# Patient Record
Sex: MALE | Race: BLACK OR AFRICAN AMERICAN | Employment: FULL TIME | URBAN - METROPOLITAN AREA
[De-identification: names, ages, dates, MRNs, and addresses within clinical notes are randomized per-mention and may not be internally consistent; named-entity substitution may affect disease eponyms.]

---

## 2017-05-24 ENCOUNTER — APPOINTMENT (OUTPATIENT)
Dept: CT IMAGING | Age: 42
End: 2017-05-24
Attending: EMERGENCY MEDICINE
Payer: MEDICAID

## 2017-05-24 ENCOUNTER — HOSPITAL ENCOUNTER (EMERGENCY)
Age: 42
Discharge: HOME | End: 2017-05-24
Attending: EMERGENCY MEDICINE | Admitting: EMERGENCY MEDICINE
Payer: MEDICAID

## 2017-05-24 VITALS
TEMPERATURE: 97.7 F | WEIGHT: 217 LBS | OXYGEN SATURATION: 99 % | RESPIRATION RATE: 18 BRPM | DIASTOLIC BLOOD PRESSURE: 87 MMHG | BODY MASS INDEX: 36.15 KG/M2 | SYSTOLIC BLOOD PRESSURE: 141 MMHG | HEIGHT: 65 IN | HEART RATE: 89 BPM

## 2017-05-24 DIAGNOSIS — N23 RENAL COLIC: Primary | ICD-10-CM

## 2017-05-24 LAB
ANION GAP BLD CALC-SCNC: 9 MMOL/L (ref 3–18)
APPEARANCE UR: CLEAR
BASOPHILS # BLD AUTO: 0 K/UL (ref 0–0.06)
BASOPHILS # BLD: 0 % (ref 0–2)
BILIRUB UR QL: NEGATIVE
BUN SERPL-MCNC: 10 MG/DL (ref 7–18)
BUN/CREAT SERPL: 9 (ref 12–20)
CALCIUM SERPL-MCNC: 8.8 MG/DL (ref 8.5–10.1)
CHLORIDE SERPL-SCNC: 106 MMOL/L (ref 100–108)
CO2 SERPL-SCNC: 24 MMOL/L (ref 21–32)
COLOR UR: YELLOW
CREAT SERPL-MCNC: 1.12 MG/DL (ref 0.6–1.3)
DIFFERENTIAL METHOD BLD: NORMAL
EOSINOPHIL # BLD: 0.1 K/UL (ref 0–0.4)
EOSINOPHIL NFR BLD: 2 % (ref 0–5)
ERYTHROCYTE [DISTWIDTH] IN BLOOD BY AUTOMATED COUNT: 13.3 % (ref 11.6–14.5)
GLUCOSE SERPL-MCNC: 138 MG/DL (ref 74–99)
GLUCOSE UR STRIP.AUTO-MCNC: NEGATIVE MG/DL
HCT VFR BLD AUTO: 42.5 % (ref 36–48)
HGB BLD-MCNC: 14.5 G/DL (ref 13–16)
HGB UR QL STRIP: NEGATIVE
KETONES UR QL STRIP.AUTO: NEGATIVE MG/DL
LEUKOCYTE ESTERASE UR QL STRIP.AUTO: NEGATIVE
LYMPHOCYTES # BLD AUTO: 42 % (ref 21–52)
LYMPHOCYTES # BLD: 2 K/UL (ref 0.9–3.6)
MCH RBC QN AUTO: 30.6 PG (ref 24–34)
MCHC RBC AUTO-ENTMCNC: 34.1 G/DL (ref 31–37)
MCV RBC AUTO: 89.7 FL (ref 74–97)
MONOCYTES # BLD: 0.4 K/UL (ref 0.05–1.2)
MONOCYTES NFR BLD AUTO: 9 % (ref 3–10)
NEUTS SEG # BLD: 2.3 K/UL (ref 1.8–8)
NEUTS SEG NFR BLD AUTO: 47 % (ref 40–73)
NITRITE UR QL STRIP.AUTO: NEGATIVE
PH UR STRIP: 7.5 [PH] (ref 5–8)
PLATELET # BLD AUTO: 230 K/UL (ref 135–420)
PMV BLD AUTO: 10.5 FL (ref 9.2–11.8)
POTASSIUM SERPL-SCNC: 3.9 MMOL/L (ref 3.5–5.5)
PROT UR STRIP-MCNC: NEGATIVE MG/DL
RBC # BLD AUTO: 4.74 M/UL (ref 4.7–5.5)
SODIUM SERPL-SCNC: 139 MMOL/L (ref 136–145)
SP GR UR REFRACTOMETRY: 1.02 (ref 1–1.03)
UROBILINOGEN UR QL STRIP.AUTO: 1 EU/DL (ref 0.2–1)
WBC # BLD AUTO: 4.9 K/UL (ref 4.6–13.2)

## 2017-05-24 PROCEDURE — 96375 TX/PRO/DX INJ NEW DRUG ADDON: CPT

## 2017-05-24 PROCEDURE — 81003 URINALYSIS AUTO W/O SCOPE: CPT | Performed by: EMERGENCY MEDICINE

## 2017-05-24 PROCEDURE — 96374 THER/PROPH/DIAG INJ IV PUSH: CPT

## 2017-05-24 PROCEDURE — 96361 HYDRATE IV INFUSION ADD-ON: CPT

## 2017-05-24 PROCEDURE — 85025 COMPLETE CBC W/AUTO DIFF WBC: CPT | Performed by: EMERGENCY MEDICINE

## 2017-05-24 PROCEDURE — 80048 BASIC METABOLIC PNL TOTAL CA: CPT | Performed by: EMERGENCY MEDICINE

## 2017-05-24 PROCEDURE — 74011250636 HC RX REV CODE- 250/636: Performed by: EMERGENCY MEDICINE

## 2017-05-24 PROCEDURE — 99284 EMERGENCY DEPT VISIT MOD MDM: CPT

## 2017-05-24 PROCEDURE — 74176 CT ABD & PELVIS W/O CONTRAST: CPT

## 2017-05-24 RX ORDER — HYDROCODONE BITARTRATE AND ACETAMINOPHEN 5; 325 MG/1; MG/1
1 TABLET ORAL
Qty: 6 TAB | Refills: 0 | Status: SHIPPED | OUTPATIENT
Start: 2017-05-24 | End: 2017-06-01 | Stop reason: ALTCHOICE

## 2017-05-24 RX ORDER — HYDROMORPHONE HYDROCHLORIDE 1 MG/ML
1 INJECTION, SOLUTION INTRAMUSCULAR; INTRAVENOUS; SUBCUTANEOUS ONCE
Status: COMPLETED | OUTPATIENT
Start: 2017-05-24 | End: 2017-05-24

## 2017-05-24 RX ORDER — KETOROLAC TROMETHAMINE 30 MG/ML
30 INJECTION, SOLUTION INTRAMUSCULAR; INTRAVENOUS
Status: COMPLETED | OUTPATIENT
Start: 2017-05-24 | End: 2017-05-24

## 2017-05-24 RX ORDER — ONDANSETRON 2 MG/ML
4 INJECTION INTRAMUSCULAR; INTRAVENOUS
Status: COMPLETED | OUTPATIENT
Start: 2017-05-24 | End: 2017-05-24

## 2017-05-24 RX ADMIN — KETOROLAC TROMETHAMINE 30 MG: 30 INJECTION, SOLUTION INTRAMUSCULAR at 09:11

## 2017-05-24 RX ADMIN — SODIUM CHLORIDE 1000 ML: 900 INJECTION, SOLUTION INTRAVENOUS at 09:11

## 2017-05-24 RX ADMIN — ONDANSETRON 4 MG: 2 INJECTION INTRAMUSCULAR; INTRAVENOUS at 09:11

## 2017-05-24 RX ADMIN — HYDROMORPHONE HYDROCHLORIDE 1 MG: 1 INJECTION, SOLUTION INTRAMUSCULAR; INTRAVENOUS; SUBCUTANEOUS at 09:11

## 2017-05-24 NOTE — PROGRESS NOTES
Referral received to assist pt with PCP follow up. Met with pt and agreed for me to assist. Appointment scheduled with Dr Westley Jones on 6/1/17 at 1330 check in at 1300. Appointment card, [facility] contact #, [address] and prescription discount card given to pt.

## 2017-05-24 NOTE — ED PROVIDER NOTES
HPI Comments: 8:54 AM Teodoro House is a 37 y.o. male with a history of HTN an who presents to the emergency department c/o R flank pain onset 4 days ago. The patient explains Saturday when he got off of work he passed a kidney stone while urinating. Pt notes that he has passed kidney stones in the past while living in [place], and was prescribed medications that helped temporarily. Pt reports associated symptoms of hematuria, nausea, and vomiting. Pt admits tobacco usage. No other concerns at this time. PCP: None      The history is provided by the patient. Past Medical History:   Diagnosis Date    Hypertension     Kidney stone        History reviewed. No pertinent surgical history. History reviewed. No pertinent family history. Social History     Social History    Marital status: SINGLE     Spouse name: N/A    Number of children: N/A    Years of education: N/A     Occupational History    Not on file. Social History Main Topics    Smoking status: Not on file    Smokeless tobacco: Not on file    Alcohol use Not on file    Drug use: Not on file    Sexual activity: Not on file     Other Topics Concern    Not on file     Social History Narrative    No narrative on file         ALLERGIES: Review of patient's allergies indicates no known allergies. Review of Systems   Constitutional: Negative for chills, fatigue and fever. HENT: Negative for congestion, rhinorrhea and sore throat. Respiratory: Negative for cough and shortness of breath. Cardiovascular: Negative for chest pain and palpitations. Gastrointestinal: Positive for nausea and vomiting. Negative for abdominal pain and diarrhea. Genitourinary: Positive for flank pain (R) and hematuria. Negative for dysuria and urgency. Musculoskeletal: Negative for myalgias. Skin: Negative for rash and wound. Neurological: Negative for dizziness and headaches. All other systems reviewed and are negative.       Vitals: 05/24/17 0900 05/24/17 0945 05/24/17 1000 05/24/17 1015   BP: (!) 169/118 (!) 169/114 (!) 173/108 (!) 157/109   Pulse:       Resp:       Temp:       SpO2: 100% 99% 98% 99%   Weight:       Height:                Physical Exam   Constitutional: He is oriented to person, place, and time. He appears well-developed and well-nourished. No distress. Uncomfortable appearing. HENT:   Head: Normocephalic and atraumatic. Mouth/Throat: Oropharynx is clear and moist.   Eyes: Conjunctivae and EOM are normal. Pupils are equal, round, and reactive to light. No scleral icterus. Neck: Normal range of motion. Neck supple. Cardiovascular: Normal rate, regular rhythm and normal heart sounds. No murmur heard. Pulmonary/Chest: Effort normal and breath sounds normal. No respiratory distress. Abdominal: Soft. Bowel sounds are normal. He exhibits no distension. There is no tenderness. No tenderness currently. Musculoskeletal: He exhibits no edema. Lymphadenopathy:     He has no cervical adenopathy. Neurological: He is alert and oriented to person, place, and time. Coordination normal.   Skin: Skin is warm and dry. No rash noted. Psychiatric: He has a normal mood and affect. His behavior is normal.   Nursing note and vitals reviewed.        MDM  Number of Diagnoses or Management Options  Renal colic:   Diagnosis management comments: r flank pain h/o renal colic ct and ua reviewed pain improved will dc home        Amount and/or Complexity of Data Reviewed  Clinical lab tests: ordered and reviewed  Tests in the radiology section of CPT®: ordered and reviewed      ED Course       Procedures    Vitals:  Patient Vitals for the past 12 hrs:   Temp Pulse Resp BP SpO2   05/24/17 1015 - - - (!) 157/109 99 %   05/24/17 1000 - - - (!) 173/108 98 %   05/24/17 0945 - - - (!) 169/114 99 %   05/24/17 0900 - - - (!) 169/118 100 %   05/24/17 0853 - - - (!) 156/107 -   05/24/17 0839 97.7 °F (36.5 °C) 89 18 (!) 165/119 100 % Medications ordered:   Medications   sodium chloride 0.9 % bolus infusion 1,000 mL (1,000 mL IntraVENous New Bag 5/24/17 0911)   ketorolac (TORADOL) injection 30 mg (30 mg IntraVENous Given 5/24/17 0911)   HYDROmorphone (PF) (DILAUDID) injection 1 mg (1 mg IntraVENous Given 5/24/17 0911)   ondansetron (ZOFRAN) injection 4 mg (4 mg IntraVENous Given 5/24/17 0911)         Lab findings:  Recent Results (from the past 12 hour(s))   CBC WITH AUTOMATED DIFF    Collection Time: 05/24/17  9:00 AM   Result Value Ref Range    WBC 4.9 4.6 - 13.2 K/uL    RBC 4.74 4.70 - 5.50 M/uL    HGB 14.5 13.0 - 16.0 g/dL    HCT 42.5 36.0 - 48.0 %    MCV 89.7 74.0 - 97.0 FL    MCH 30.6 24.0 - 34.0 PG    MCHC 34.1 31.0 - 37.0 g/dL    RDW 13.3 11.6 - 14.5 %    PLATELET 859 180 - 936 K/uL    MPV 10.5 9.2 - 11.8 FL    NEUTROPHILS 47 40 - 73 %    LYMPHOCYTES 42 21 - 52 %    MONOCYTES 9 3 - 10 %    EOSINOPHILS 2 0 - 5 %    BASOPHILS 0 0 - 2 %    ABS. NEUTROPHILS 2.3 1.8 - 8.0 K/UL    ABS. LYMPHOCYTES 2.0 0.9 - 3.6 K/UL    ABS. MONOCYTES 0.4 0.05 - 1.2 K/UL    ABS. EOSINOPHILS 0.1 0.0 - 0.4 K/UL    ABS.  BASOPHILS 0.0 0.0 - 0.06 K/UL    DF AUTOMATED     METABOLIC PANEL, BASIC    Collection Time: 05/24/17  9:00 AM   Result Value Ref Range    Sodium 139 136 - 145 mmol/L    Potassium 3.9 3.5 - 5.5 mmol/L    Chloride 106 100 - 108 mmol/L    CO2 24 21 - 32 mmol/L    Anion gap 9 3.0 - 18 mmol/L    Glucose 138 (H) 74 - 99 mg/dL    BUN 10 7.0 - 18 MG/DL    Creatinine 1.12 0.6 - 1.3 MG/DL    BUN/Creatinine ratio 9 (L) 12 - 20      GFR est AA >60 >60 ml/min/1.73m2    GFR est non-AA >60 >60 ml/min/1.73m2    Calcium 8.8 8.5 - 10.1 MG/DL   URINALYSIS W/ RFLX MICROSCOPIC    Collection Time: 05/24/17 10:30 AM   Result Value Ref Range    Color YELLOW      Appearance CLEAR      Specific gravity 1.020 1.005 - 1.030      pH (UA) 7.5 5.0 - 8.0      Protein NEGATIVE  NEG mg/dL    Glucose NEGATIVE  NEG mg/dL    Ketone NEGATIVE  NEG mg/dL    Bilirubin NEGATIVE  NEG Blood NEGATIVE  NEG      Urobilinogen 1.0 0.2 - 1.0 EU/dL    Nitrites NEGATIVE  NEG      Leukocyte Esterase NEGATIVE  NEG               X-Ray, CT or other radiology findings or impressions:  CT ABD PELV WO CONT    (Results )  IMPRESSION:        1. No renal or ureteral calculi. No signs of obstructive uropathy. 2. Normal appendix. 3. Mild intramural fat right colon around to the mid transverse colon, normal  finding versus chronic sequela of inflammatory bowel disease. No evidence of  acute bowel inflammation or obstruction. 4. Questionable wall thickening of the urinary bladder raising the possibility  of cystitis. Orders:  Orders Placed This Encounter    CT ABD PELV WO CONT     Standing Status:   Standing     Number of Occurrences:   1     Order Specific Question:   Transport     Answer:   Cristiana [5]     Order Specific Question:   Is Patient Allergic to Contrast Dye? Answer:   No    URINALYSIS W/ RFLX MICROSCOPIC     Standing Status:   Standing     Number of Occurrences:   1    CBC WITH AUTOMATED DIFF     Standing Status:   Standing     Number of Occurrences:   1    METABOLIC PANEL, BASIC     Standing Status:   Standing     Number of Occurrences:   1    sodium chloride 0.9 % bolus infusion 1,000 mL    ketorolac (TORADOL) injection 30 mg    HYDROmorphone (PF) (DILAUDID) injection 1 mg    ondansetron (ZOFRAN) injection 4 mg    HYDROcodone-acetaminophen (NORCO) 5-325 mg per tablet     Sig: Take 1 Tab by mouth every four (4) hours as needed for Pain. Max Daily Amount: 6 Tabs. Dispense:  6 Tab     Refill:  0     CONSULT     Standing Status:   Standing     Number of Occurrences:   1     Order Specific Question:   Reason for Consult: Answer:   medical follow up       Progress notes, Consult notes, or additional Procedure notes:       Disposition:  Diagnosis:   1.  Renal colic        Disposition:     Follow-up Information     Follow up With Details Comments [id number] [facility] Chris Johnson MD On 6/1/2017 at 1:30PM check in at [address]  [address] [facility] EMERGENCY DEPT  As needed, If symptoms worsen [address]  [phone]           Patient's Medications   Start Taking    HYDROCODONE-ACETAMINOPHEN (NORCO) 5-325 MG PER TABLET    Take 1 Tab by mouth every four (4) hours as needed for Pain. Max Daily Amount: 6 Tabs. Continue Taking    No medications on file   These Medications have changed    No medications on file   Stop Taking    No medications on file             [address] for and in presence of Lola Fulton MD (05/24/17)      Physician Attestation  I personally preformed the services described in this documentation, reviewed and edited the documentation which was dictated to the scribe in my presence, and it accurately records my words and actions.      Lola Fulton MD (05/24/17)      Signed by: Thao Willoughby, 05/24/17, 8:54 AM

## 2017-05-24 NOTE — LETTER
[facility] EMERGENCY DEPT 
[facility] [id number] 
[phone] Work/School Note Date: 5/24/2017 To Whom It May concern: 
 
Altagracia Oconnor was seen and treated today in the emergency room by the following provider(s): 
Attending Provider: Mike Valerio MD.   
 
Altagracia Oconnor may return to work on 5/25/17.  
 
Sincerely, 
 
 
 
 
Natalie Romano RN

## 2017-05-24 NOTE — ED NOTES
Pt being discharged home. Discharge instructions given, and pt expresses understanding. Discontinued IV and helped patient to gather belongings. Pt discharged with family member.  Prescription given for norco.

## 2017-05-24 NOTE — DISCHARGE INSTRUCTIONS
Kidney Stone: Care Instructions  Your Care Instructions    Kidney stones are formed when salts, minerals, and other substances normally found in the urine clump together. They can be as small as grains of sand or, rarely, as large as golf balls. While the stone is traveling through the ureter, which is the tube that carries urine from the kidney to the bladder, you will probably feel pain. The pain may be mild or very severe. You may also have some blood in your urine. As soon as the stone reaches the bladder, any intense pain should go away. If a stone is too large to pass on its own, you may need a medical procedure to help you pass the stone. The doctor has checked you carefully, but problems can develop later. If you notice any problems or new symptoms, get medical treatment right away. Follow-up care is a key part of your treatment and safety. Be sure to make and go to all appointments, and call your doctor if you are having problems. It's also a good idea to know your test results and keep a list of the medicines you take. How can you care for yourself at home? · Drink plenty of fluids, enough so that your urine is light yellow or clear like water. If you have kidney, heart, or liver disease and have to limit fluids, talk with your doctor before you increase the amount of fluids you drink. · Take pain medicines exactly as directed. Call your doctor if you think you are having a problem with your medicine. ¨ If the doctor gave you a prescription medicine for pain, take it as prescribed. ¨ If you are not taking a prescription pain medicine, ask your doctor if you can take an over-the-counter medicine. Read and follow all instructions on the label. · Your doctor may ask you to strain your urine so that you can collect your kidney stone when it passes. You can use a kitchen strainer or a tea strainer to catch the stone. Store it in a plastic bag until you see your doctor again.   Preventing future kidney stones  Some changes in your diet may help prevent kidney stones. Depending on the cause of your stones, your doctor may recommend that you:  · Drink plenty of fluids, enough so that your urine is light yellow or clear like water. If you have kidney, heart, or liver disease and have to limit fluids, talk with your doctor before you increase the amount of fluids you drink. · Limit coffee, tea, and alcohol. Also avoid grapefruit juice. · Do not take more than the recommended daily dose of vitamins C and D.  · Avoid antacids such as Gaviscon, Maalox, Mylanta, or Tums. · Limit the amount of salt (sodium) in your diet. · Eat a balanced diet that is not too high in protein. · Limit foods that are high in a substance called oxalate, which can cause kidney stones. These foods include dark green vegetables, rhubarb, chocolate, wheat bran, nuts, cranberries, and beans. When should you call for help? Call your doctor now or seek immediate medical care if:  · You cannot keep down fluids. · Your pain gets worse. · You have a fever or chills. · You have new or worse pain in your back just below your rib cage (the flank area). · You have new or more blood in your urine. Watch closely for changes in your health, and be sure to contact your doctor if:  · You do not get better as expected. Where can you learn more? Go to http://nasra-ashleigh.info/. Enter F565 in the search box to learn more about \"Kidney Stone: Care Instructions. \"  Current as of: November 20, 2015  Content Version: 11.2  © 3516-9294 [employer]. Care instructions adapted under license by [employer] (which disclaims liability or warranty for this information). If you have questions about a medical condition or this instruction, always ask your healthcare professional. [address] any warranty or liability for your use of this information.

## 2017-06-01 ENCOUNTER — HOSPITAL ENCOUNTER (OUTPATIENT)
Dept: LAB | Age: 42
Discharge: HOME | End: 2017-06-01
Payer: MEDICAID

## 2017-06-01 ENCOUNTER — OFFICE VISIT (OUTPATIENT)
Dept: FAMILY MEDICINE CLINIC | Age: 42
End: 2017-06-01

## 2017-06-01 ENCOUNTER — HOSPITAL ENCOUNTER (OUTPATIENT)
Dept: GENERAL RADIOLOGY | Age: 42
Discharge: HOME | End: 2017-06-01
Payer: MEDICAID

## 2017-06-01 VITALS
HEART RATE: 99 BPM | HEIGHT: 65 IN | DIASTOLIC BLOOD PRESSURE: 111 MMHG | TEMPERATURE: 97.6 F | RESPIRATION RATE: 18 BRPM | WEIGHT: 214 LBS | BODY MASS INDEX: 35.65 KG/M2 | SYSTOLIC BLOOD PRESSURE: 175 MMHG | OXYGEN SATURATION: 97 %

## 2017-06-01 DIAGNOSIS — K40.90 INGUINAL HERNIA OF LEFT SIDE WITHOUT OBSTRUCTION OR GANGRENE: ICD-10-CM

## 2017-06-01 DIAGNOSIS — M54.9 ACUTE MIDLINE BACK PAIN, UNSPECIFIED BACK LOCATION: ICD-10-CM

## 2017-06-01 DIAGNOSIS — I10 ESSENTIAL HYPERTENSION: Primary | ICD-10-CM

## 2017-06-01 DIAGNOSIS — K62.5 RECTAL BLEEDING: ICD-10-CM

## 2017-06-01 DIAGNOSIS — R73.01 IFG (IMPAIRED FASTING GLUCOSE): ICD-10-CM

## 2017-06-01 LAB
CRP SERPL-MCNC: 0.6 MG/DL (ref 0–0.3)
ERYTHROCYTE [SEDIMENTATION RATE] IN BLOOD: 5 MM/HR (ref 0–15)

## 2017-06-01 PROCEDURE — 72100 X-RAY EXAM L-S SPINE 2/3 VWS: CPT

## 2017-06-01 RX ORDER — CYCLOBENZAPRINE HCL 10 MG
10 TABLET ORAL
Qty: 30 TAB | Refills: 1 | Status: SHIPPED | OUTPATIENT
Start: 2017-06-01 | End: 2017-09-14

## 2017-06-01 RX ORDER — LISINOPRIL 10 MG/1
10 TABLET ORAL DAILY
Qty: 30 TAB | Refills: 3 | Status: SHIPPED | OUTPATIENT
Start: 2017-06-01 | End: 2017-06-15 | Stop reason: DRUGHIGH

## 2017-06-01 RX ORDER — HYDROCHLOROTHIAZIDE 25 MG/1
25 TABLET ORAL DAILY
Qty: 30 TAB | Refills: 3 | Status: SHIPPED | OUTPATIENT
Start: 2017-06-01 | End: 2017-08-14 | Stop reason: SDUPTHER

## 2017-06-01 RX ORDER — OXYCODONE AND ACETAMINOPHEN 5; 325 MG/1; MG/1
1 TABLET ORAL
Qty: 15 TAB | Refills: 0 | Status: SHIPPED | OUTPATIENT
Start: 2017-06-01 | End: 2017-06-15 | Stop reason: SDUPTHER

## 2017-06-01 NOTE — MR AVS SNAPSHOT
Visit Information Date & Time Provider Department Dept. Phone Encounter #  
 6/1/2017  1:30 PM Thuan Barron MD [place] [address] [facility] [phone] Follow-up Instructions Return in about 2 weeks (around 6/15/2017). Upcoming Health Maintenance Date Due DTaP/Tdap/Td series (1 - Tdap) 12/4/1997 INFLUENZA AGE 9 TO ADULT 8/1/2017 Allergies as of 6/1/2017  Review Complete On: 6/1/2017 By: Thuan Barron MD  
 No Known Allergies Current Immunizations  Never Reviewed No immunizations on file. Not reviewed this visit You Were Diagnosed With   
  
 Codes Comments Essential hypertension    -  Primary ICD-10-CM: I10 
ICD-9-CM: 401.9 Rectal bleeding     ICD-10-CM: K62.5 ICD-9-CM: 569.3 IFG (impaired fasting glucose)     ICD-10-CM: R73.01 
ICD-9-CM: 790.21 Acute midline back pain, unspecified back location     ICD-10-CM: M54.9 ICD-9-CM: 724.5 Inguinal hernia of left side without obstruction or gangrene     ICD-10-CM: K40.90 ICD-9-CM: 550.90 Vitals BP Pulse Temp Resp Height(growth percentile) Weight(growth percentile) (!) 175/111 99 97.6 °F (36.4 °C) (Oral) 18 5' 5\" (1.651 m) 214 lb (97.1 kg) SpO2 BMI Smoking Status 97% 35.61 kg/m2 Current Every Day Smoker Vitals History BMI and BSA Data Body Mass Index Body Surface Area  
 35.61 kg/m 2 2.11 m 2 Preferred Pharmacy Pharmacy Name Phone [facility] [address], [place] [phone] Your Updated Medication List  
  
   
This list is accurate as of: 6/1/17  3:03 PM.  Always use your most recent med list.  
  
  
  
  
 cyclobenzaprine 10 mg tablet Commonly known as:  FLEXERIL Take 1 Tab by mouth three (3) times daily as needed for Muscle Spasm(s). hydroCHLOROthiazide 25 mg tablet Commonly known as:  HYDRODIURIL Take 1 Tab by mouth daily. lisinopril 10 mg tablet Commonly known as:  Nadya Gravel Take 1 Tab by mouth daily. oxyCODONE-acetaminophen 5-325 mg per tablet Commonly known as:  PERCOCET Take 1 Tab by mouth every eight (8) hours as needed for Pain. Max Daily Amount: 3 Tabs. Prescriptions Printed Refills  
 oxyCODONE-acetaminophen (PERCOCET) 5-325 mg per tablet 0 Sig: Take 1 Tab by mouth every eight (8) hours as needed for Pain. Max Daily Amount: 3 Tabs. Class: Print Route: Oral  
  
Prescriptions Sent to Pharmacy Refills  
 hydroCHLOROthiazide (HYDRODIURIL) 25 mg tablet 3 Sig: Take 1 Tab by mouth daily. Class: Normal  
 Pharmacy: [id number], [address] Ph #: [phone] Route: Oral  
 lisinopril (PRINIVIL, ZESTRIL) 10 mg tablet 3 Sig: Take 1 Tab by mouth daily. Class: Normal  
 Pharmacy: [id number], [address] Ph #: [phone] Route: Oral  
 cyclobenzaprine (FLEXERIL) 10 mg tablet 1 Sig: Take 1 Tab by mouth three (3) times daily as needed for Muscle Spasm(s). Class: Normal  
 Pharmacy: [id number], [address] Ph #: [phone] Route: Oral  
  
We Performed the Following AMB POC HEMOGLOBIN A1C [59255 CPT(R)] REFERRAL TO GENERAL SURGERY [REF27 Custom] Follow-up Instructions Return in about 2 weeks (around 6/15/2017). To-Do List   
 06/01/2017 Lab:  C REACTIVE PROTEIN, QT   
  
 06/01/2017 Lab:  [id number]   
  
 06/01/2017 Lab:  SED RATE (ESR)   
  
 06/01/2017 Imaging:  XR SPINE LUMB 2 OR 3 V Referral Information Referral ID Referred By Referred To  
  
 [id number] [facility], Fanny Fenton MD   
   [address] [address] Phone: [phone] Fax: [phone] Visits Status Start Date End Date 1 New Request 6/1/17 6/1/18 If your referral has a status of pending review or denied, additional information will be sent to support the outcome of this decision. Introducing \[facility] [place] [facility]\"" & HEALTH SERVICES! Mihai Regan introduces [email or web address] patient portal. Now you can access parts of your medical record, email your doctor's office, and request medication refills online. 1. In your internet browser, go to [email or web address] 2. Click on the First Time User? Click Here link in the Sign In box. You will see the New Member Sign Up page. 3. Enter your [email or web address] Access Code exactly as it appears below. You will not need to use this code after youve completed the sign-up process. If you do not sign up before the expiration date, you must request a new code. · [email or web address] Access Code: [email or web address] Expires: 8/22/2017 11:07 AM 
 
4. Enter the last four digits of your Social Security Number (xxxx) and Date of Birth (mm/dd/yyyy) as indicated and click Submit. You will be taken to the next sign-up page. 5. Create a [email or web address] ID. This will be your [email or web address] login ID and cannot be changed, so think of one that is secure and easy to remember. 6. Create a [email or web address] password. You can change your password at any time. 7. Enter your Password Reset Question and Answer. This can be used at a later time if you forget your password. 8. Enter your e-mail address. You will receive e-mail notification when new information is available in [address] 9. Click Sign Up. You can now view and download portions of your medical record. 10. Click the Download Summary menu link to download a portable copy of your medical information. If you have questions, please visit the Frequently Asked Questions section of the [email or web address] website. Remember, [email or web address] is NOT to be used for urgent needs. For medical emergencies, dial 911. Now available from your iPhone and Android! Please provide this summary of care documentation to your next provider. Your primary care clinician is listed as Fahad Sanchez. If you have any questions after today's visit, please call [phone].

## 2017-06-01 NOTE — PROGRESS NOTES
Marika Mancuso is a 37 y.o.  male and presents with     Chief Complaint   Patient presents with    Hypertension    Back Pain    Kidney Stone       Pt is originally from [place]. He is a  but does not have a job at this time. Pt says he has h/o kidney stones. Pt went to ER with back pain that started a month ago. Since pt has h/o kidney stones- CT scan was done - that did not show any stones. But is it did mention possibility of inflammation in the colon. Pt went to [facility] in December with rectal bleeding. CT abdomen at that time was unremarkable. Pt says his bac hurts and the medicien is not helping him. Pt is not sure if he pulled a muscle. Pt also has h/o HTN. He ran out of meds 2 months back. Past Medical History:   Diagnosis Date    Hypertension     Kidney stone      History reviewed. No pertinent surgical history. Current Outpatient Prescriptions   Medication Sig    hydroCHLOROthiazide (HYDRODIURIL) 25 mg tablet Take 1 Tab by mouth daily.  lisinopril (PRINIVIL, ZESTRIL) 10 mg tablet Take 1 Tab by mouth daily.  cyclobenzaprine (FLEXERIL) 10 mg tablet Take 1 Tab by mouth three (3) times daily as needed for Muscle Spasm(s).  oxyCODONE-acetaminophen (PERCOCET) 5-325 mg per tablet Take 1 Tab by mouth every eight (8) hours as needed for Pain. Max Daily Amount: 3 Tabs. No current facility-administered medications for this visit. Health Maintenance   Topic Date Due    DTaP/Tdap/Td series (1 - Tdap) 12/04/1997    INFLUENZA AGE 9 TO ADULT  08/01/2017       There is no immunization history on file for this patient. No LMP for male patient. Allergies and Intolerances:   No Known Allergies    Family History:   History reviewed. No pertinent family history. Social History:   He  reports that he has been smoking Cigarettes. He has never used smokeless tobacco.  He  reports that he does not drink alcohol.             Review of Systems:   General: negative for - chills, fatigue, fever, weight change  Psych: negative for - anxiety, depression, irritability or mood swings  ENT: negative for - headaches, hearing change, nasal congestion, oral lesions, sneezing or sore throat  Heme/ Lymph: negative for - bleeding problems, bruising, pallor or swollen lymph nodes  Endo: negative for - hot flashes, polydipsia/polyuria or temperature intolerance  Resp: negative for - cough, shortness of breath or wheezing  CV: negative for - chest pain, edema or palpitations  GI: negative for - abdominal pain, change in bowel habits, constipation, diarrhea or nausea/vomiting  : negative for - dysuria, hematuria, incontinence, pelvic pain or vulvar/vaginal symptoms  MSK: negative for - joint pain, joint swelling or muscle pain, pos for back pain  Neuro: negative for - confusion, headaches, seizures or weakness  Derm: negative for - dry skin, hair changes, rash or skin lesion changes          Physical:   Vitals:   Vitals:    06/01/17 1400   BP: (!) 175/111   Pulse: 99   Resp: 18   Temp: 97.6 °F (36.4 °C)   TempSrc: Oral   SpO2: 97%   Weight: 214 lb (97.1 kg)   Height: 5' 5\" (1.651 m)           Exam:   HEENT- atraumatic,normocephalic, awake, oriented, well nourished  Neck - supple,no enlarged lymph nodes, no JVD, no thyromegaly  Chest- CTA, no rhonchi, no crackles  Heart- rrr, no murmurs / gallop/rub  Abdomen- soft,BS+,NT, no hepatosplenomegaly  Ext - no c/c/edema   Neuro- no focal deficits. Power 5/5 all extremities  Skin - warm,dry, no obvious rashes.           Review of Data:   LABS:   Lab Results   Component Value Date/Time    WBC 4.9 05/24/2017 09:00 AM    HGB 14.5 05/24/2017 09:00 AM    HCT 42.5 05/24/2017 09:00 AM    PLATELET 614 80/01/2017 09:00 AM     Lab Results   Component Value Date/Time    Sodium 139 05/24/2017 09:00 AM    Potassium 3.9 05/24/2017 09:00 AM    Chloride 106 05/24/2017 09:00 AM    CO2 24 05/24/2017 09:00 AM    Glucose 138 05/24/2017 09:00 AM    BUN 10 05/24/2017 09:00 AM Creatinine 1.12 05/24/2017 09:00 AM     No results found for: CHOL, CHOLX, CHLST, CHOLV, HDL, LDL, DLDL, LDLC, DLDLP, TGL, TGLX, TRIGL, TRIGP  No results found for: GPT        Impression / Plan:        ICD-10-CM ICD-9-CM    1. Essential hypertension I10 401.9 hydroCHLOROthiazide (HYDRODIURIL) 25 mg tablet      lisinopril (PRINIVIL, ZESTRIL) 10 mg tablet   2. Rectal bleeding K62.5 569.3 REFERRAL TO GENERAL SURGERY   3. IFG (impaired fasting glucose) R73.01 790.21 AMB POC HEMOGLOBIN A1C   4. Acute midline back pain, unspecified back location M54.9 724.5 XR SPINE LUMB 2 OR 3 V      [id number]      SED RATE (ESR)      C REACTIVE PROTEIN, QT      cyclobenzaprine (FLEXERIL) 10 mg tablet      oxyCODONE-acetaminophen (PERCOCET) 5-325 mg per tablet   5. Inguinal hernia of left side without obstruction or gangrene K40.90 550.90       reviewed. Explained to patient risk benefits of the medications. Advised patient to stop meds if having any side effects. Pt verbalized understanding of the instructions. I have discussed the diagnosis with the patient and the intended plan as seen in the above orders. The patient has received an after-visit summary and questions were answered concerning future plans. I have discussed medication side effects and warnings with the patient as well. I have reviewed the plan of care with the patient, accepted their input and they are in agreement with the treatment goals. Reviewed plan of care. Patient has provided input and agrees with goals.     Follow-up Disposition: Not on Brenda Braswell MD

## 2017-06-06 ENCOUNTER — TELEPHONE (OUTPATIENT)
Dept: FAMILY MEDICINE CLINIC | Age: 42
End: 2017-06-06

## 2017-06-06 LAB — HLA-B27 QL NAA+PROBE: NEGATIVE

## 2017-06-15 ENCOUNTER — OFFICE VISIT (OUTPATIENT)
Dept: FAMILY MEDICINE CLINIC | Age: 42
End: 2017-06-15

## 2017-06-15 VITALS
HEART RATE: 106 BPM | TEMPERATURE: 98.1 F | WEIGHT: 215 LBS | SYSTOLIC BLOOD PRESSURE: 152 MMHG | BODY MASS INDEX: 35.82 KG/M2 | HEIGHT: 65 IN | DIASTOLIC BLOOD PRESSURE: 117 MMHG | OXYGEN SATURATION: 95 %

## 2017-06-15 DIAGNOSIS — M54.9 ACUTE MIDLINE BACK PAIN, UNSPECIFIED BACK LOCATION: ICD-10-CM

## 2017-06-15 DIAGNOSIS — G89.29 CHRONIC BILATERAL BACK PAIN, UNSPECIFIED BACK LOCATION: Primary | ICD-10-CM

## 2017-06-15 DIAGNOSIS — M54.9 CHRONIC BILATERAL BACK PAIN, UNSPECIFIED BACK LOCATION: Primary | ICD-10-CM

## 2017-06-15 DIAGNOSIS — I10 ESSENTIAL HYPERTENSION: ICD-10-CM

## 2017-06-15 LAB — HBA1C MFR BLD HPLC: 5.8 %

## 2017-06-15 RX ORDER — OXYCODONE AND ACETAMINOPHEN 5; 325 MG/1; MG/1
1 TABLET ORAL
Qty: 12 TAB | Refills: 0 | Status: SHIPPED | OUTPATIENT
Start: 2017-06-15 | End: 2017-09-14

## 2017-06-15 RX ORDER — LISINOPRIL 20 MG/1
20 TABLET ORAL 2 TIMES DAILY
Qty: 60 TAB | Refills: 3 | Status: SHIPPED | OUTPATIENT
Start: 2017-06-15 | End: 2017-08-14 | Stop reason: SINTOL

## 2017-06-15 NOTE — PROGRESS NOTES
Garfield Hayes is a 37 y.o.  male and presents with     Chief Complaint   Patient presents with    Hypertension    Back Pain    Rectal Bleeding       Pt has back pain and also has rectal bleeding. Pt does have apt with IAIN Mendenhall. Pt is taking his bllod pressure meds as directed. HTN runs in his family. No cancer in family. Pt deneis constipation. Past Medical History:   Diagnosis Date    Hypertension     Kidney stone      No past surgical history on file. Current Outpatient Prescriptions   Medication Sig    lisinopril (PRINIVIL, ZESTRIL) 20 mg tablet Take 1 Tab by mouth two (2) times a day.  oxyCODONE-acetaminophen (PERCOCET) 5-325 mg per tablet Take 1 Tab by mouth every eight (8) hours as needed for Pain. Max Daily Amount: 3 Tabs.  hydroCHLOROthiazide (HYDRODIURIL) 25 mg tablet Take 1 Tab by mouth daily.  cyclobenzaprine (FLEXERIL) 10 mg tablet Take 1 Tab by mouth three (3) times daily as needed for Muscle Spasm(s). No current facility-administered medications for this visit. Health Maintenance   Topic Date Due    Pneumococcal 19-64 Medium Risk (1 of 1 - PPSV23) 12/04/1995    DTaP/Tdap/Td series (1 - Tdap) 12/04/1997    INFLUENZA AGE 9 TO ADULT  08/01/2017       There is no immunization history on file for this patient. No LMP for male patient. Allergies and Intolerances:   No Known Allergies    Family History:   No family history on file. Social History:   He  reports that he has been smoking Cigarettes. He has never used smokeless tobacco.  He  reports that he does not drink alcohol.             Review of Systems:   General: negative for - chills, fatigue, fever, weight change  Psych: negative for - anxiety, depression, irritability or mood swings  ENT: negative for - headaches, hearing change, nasal congestion, oral lesions, sneezing or sore throat  Heme/ Lymph: negative for - bleeding problems, bruising, pallor or swollen lymph nodes  Endo: negative for - hot flashes, polydipsia/polyuria or temperature intolerance  Resp: negative for - cough, shortness of breath or wheezing  CV: negative for - chest pain, edema or palpitations  GI: negative for - abdominal pain, change in bowel habits, constipation, diarrhea or nausea/vomiting,pso for rectal bleeding  : negative for - dysuria, hematuria, incontinence, pelvic pain or vulvar/vaginal symptoms  MSK: negative for - joint pain, joint swelling or muscle pain, pos for back pain  Neuro: negative for - confusion, headaches, seizures or weakness  Derm: negative for - dry skin, hair changes, rash or skin lesion changes          Physical:   Vitals:   Vitals:    06/15/17 1015   BP: (!) 152/117   Pulse: (!) 106   Temp: 98.1 °F (36.7 °C)   TempSrc: Oral   SpO2: 95%   Weight: 215 lb (97.5 kg)   Height: 5' 5\" (1.651 m)           Exam:   HEENT- atraumatic,normocephalic, awake, oriented, well nourished  Neck - supple,no enlarged lymph nodes, no JVD, no thyromegaly  Chest- CTA, no rhonchi, no crackles  Heart- rrr, no murmurs / gallop/rub  Abdomen- soft,BS+,NT, no hepatosplenomegaly  Ext - no c/c/edema   Neuro- no focal deficits. Power 5/5 all extremities  Skin - warm,dry, no obvious rashes. Review of Data:   LABS:   Lab Results   Component Value Date/Time    WBC 4.9 05/24/2017 09:00 AM    HGB 14.5 05/24/2017 09:00 AM    HCT 42.5 05/24/2017 09:00 AM    PLATELET 061 77/82/8639 09:00 AM     Lab Results   Component Value Date/Time    Sodium 139 05/24/2017 09:00 AM    Potassium 3.9 05/24/2017 09:00 AM    Chloride 106 05/24/2017 09:00 AM    CO2 24 05/24/2017 09:00 AM    Glucose 138 05/24/2017 09:00 AM    BUN 10 05/24/2017 09:00 AM    Creatinine 1.12 05/24/2017 09:00 AM     No results found for: CHOL, CHOLX, CHLST, CHOLV, HDL, LDL, LDLC, DLDLP, TGLX, TRIGL, TRIGP  No results found for: GPT        Impression / Plan:        ICD-10-CM ICD-9-CM    1.  Chronic bilateral back pain, unspecified back location M54.9 724.5 REFERRAL TO ORTHOPEDICS G89.29 338.29    2. Essential hypertension I10 401.9 lisinopril (PRINIVIL, ZESTRIL) 20 mg tablet   3. Acute midline back pain, unspecified back location M54.9 724.5 oxyCODONE-acetaminophen (PERCOCET) 5-325 mg per tablet     Keep appt with [place] [facility]. Explained to patient risk benefits of the medications. Advised patient to stop meds if having any side effects. Pt verbalized understanding of the instructions. I have discussed the diagnosis with the patient and the intended plan as seen in the above orders. The patient has received an after-visit summary and questions were answered concerning future plans. I have discussed medication side effects and warnings with the patient as well. I have reviewed the plan of care with the patient, accepted their input and they are in agreement with the treatment goals. Reviewed plan of care. Patient has provided input and agrees with goals.     Follow-up Disposition: Not on Nataliya Hull MD

## 2017-06-15 NOTE — MR AVS SNAPSHOT
Visit Information Date & Time Provider Department Dept. Phone Encounter #  
 6/15/2017  9:30 AM Genny ELKIN Melgar, [facility] [phone] [id number] Follow-up Instructions Return in about 2 months (around 8/15/2017). Your Appointments 7/11/2017  2:00 PM  
New Patient with Elaine Steele MD  
[address]) Appt Note: Rectal Bleeding- Referred by Dr. Adam Horvath [address] [facility] 405 [facility] 83 700 [place]  
  
   
 [address] [place] [facility] 88 710 [facility] 951 Upcoming Health Maintenance Date Due Pneumococcal 19-64 Medium Risk (1 of 1 - PPSV23) 12/4/1995 DTaP/Tdap/Td series (1 - Tdap) 12/4/1997 INFLUENZA AGE 9 TO ADULT 8/1/2017 Allergies as of 6/15/2017  Review Complete On: 6/15/2017 By: [id number] ELKIN Melgar MD  
 No Known Allergies Current Immunizations  Never Reviewed No immunizations on file. Not reviewed this visit You Were Diagnosed With   
  
 Codes Comments Chronic bilateral back pain, unspecified back location    -  Primary ICD-10-CM: M54.9, G89.29 ICD-9-CM: 724.5, 338.29 Essential hypertension     ICD-10-CM: I10 
ICD-9-CM: 401.9 Acute midline back pain, unspecified back location     ICD-10-CM: M54.9 ICD-9-CM: 724.5 Vitals BP Pulse Temp Height(growth percentile) Weight(growth percentile) SpO2  
 (!) 152/117 (!) 106 98.1 °F (36.7 °C) (Oral) 5' 5\" (1.651 m) 215 lb (97.5 kg) 95% BMI Smoking Status 35.78 kg/m2 Current Every Day Smoker Vitals History BMI and BSA Data Body Mass Index Body Surface Area 35.78 kg/m 2 2.11 m 2 Preferred Pharmacy Pharmacy Name Phone [facility] [address], [place] [phone] Your Updated Medication List  
  
   
This list is accurate as of: 6/15/17 11:00 AM.  Always use your most recent med list.  
  
  
 cyclobenzaprine 10 mg tablet Commonly known as:  FLEXERIL Take 1 Tab by mouth three (3) times daily as needed for Muscle Spasm(s). hydroCHLOROthiazide 25 mg tablet Commonly known as:  HYDRODIURIL Take 1 Tab by mouth daily. lisinopril 20 mg tablet Commonly known as:  Jeremiah Leys Take 1 Tab by mouth two (2) times a day. oxyCODONE-acetaminophen 5-325 mg per tablet Commonly known as:  PERCOCET Take 1 Tab by mouth every eight (8) hours as needed for Pain. Max Daily Amount: 3 Tabs. Prescriptions Printed Refills  
 oxyCODONE-acetaminophen (PERCOCET) 5-325 mg per tablet 0 Sig: Take 1 Tab by mouth every eight (8) hours as needed for Pain. Max Daily Amount: 3 Tabs. Class: Print Route: Oral  
  
Prescriptions Sent to Pharmacy Refills  
 lisinopril (PRINIVIL, ZESTRIL) 20 mg tablet 3 Sig: Take 1 Tab by mouth two (2) times a day. Class: Normal  
 Pharmacy: [facility] [address], [address] Ph #: [phone] Route: Oral  
  
We Performed the Following REFERRAL TO ORTHOPEDICS [YMB523 Custom] Follow-up Instructions Return in about 2 months (around 8/15/2017). Referral Information Referral ID Referred By Referred To  
  
 [id number] Anna GRANADO Not Available Visits Status Start Date End Date 1 New Request 6/15/17 6/15/18 If your referral has a status of pending review or denied, additional information will be sent to support the outcome of this decision. Introducing hospitals & HEALTH SERVICES! Lyndsay Jean introduces Lumavita patient portal. Now you can access parts of your medical record, email your doctor's office, and request medication refills online. 1. In your internet browser, go to [email or web address] 2. Click on the First Time User? Click Here link in the Sign In box. You will see the New Member Sign Up page. 3. Enter your Testive Access Code exactly as it appears below. You will not need to use this code after youve completed the sign-up process. If you do not sign up before the expiration date, you must request a new code. · Testive Access Code: [email or web address] Expires: 8/22/2017 11:07 AM 
 
4. Enter the last four digits of your Social Security Number (xxxx) and Date of Birth (mm/dd/yyyy) as indicated and click Submit. You will be taken to the next sign-up page. 5. Create a Testive ID. This will be your Testive login ID and cannot be changed, so think of one that is secure and easy to remember. 6. Create a Testive password. You can change your password at any time. 7. Enter your Password Reset Question and Answer. This can be used at a later time if you forget your password. 8. Enter your e-mail address. You will receive e-mail notification when new information is available in [address] 9. Click Sign Up. You can now view and download portions of your medical record. 10. Click the Download Summary menu link to download a portable copy of your medical information. If you have questions, please visit the Frequently Asked Questions section of the Testive website. Remember, Testive is NOT to be used for urgent needs. For medical emergencies, dial 911. Now available from your iPhone and Android! Please provide this summary of care documentation to your next provider. Your primary care clinician is listed as Cindy Fletcher. If you have any questions after today's visit, please call [phone].

## 2017-06-15 NOTE — PROGRESS NOTES
1. Have you been to the ER, urgent care clinic since your last visit? Hospitalized since your last visit? No    2. Have you seen or consulted any other health care providers outside of the [address] [place] [address] since your last visit? Include any pap smears or colon screening.  No

## 2017-08-14 ENCOUNTER — OFFICE VISIT (OUTPATIENT)
Dept: FAMILY MEDICINE CLINIC | Age: 42
End: 2017-08-14

## 2017-08-14 VITALS
DIASTOLIC BLOOD PRESSURE: 100 MMHG | SYSTOLIC BLOOD PRESSURE: 156 MMHG | OXYGEN SATURATION: 96 % | HEART RATE: 94 BPM | BODY MASS INDEX: 35.96 KG/M2 | WEIGHT: 215.8 LBS | HEIGHT: 65 IN | TEMPERATURE: 96.8 F | RESPIRATION RATE: 16 BRPM

## 2017-08-14 DIAGNOSIS — G89.29 CHRONIC PAIN OF LEFT ANKLE: Primary | ICD-10-CM

## 2017-08-14 DIAGNOSIS — M25.572 CHRONIC PAIN OF LEFT ANKLE: Primary | ICD-10-CM

## 2017-08-14 DIAGNOSIS — Z87.442 HISTORY OF KIDNEY STONES: ICD-10-CM

## 2017-08-14 DIAGNOSIS — I10 ESSENTIAL HYPERTENSION: ICD-10-CM

## 2017-08-14 DIAGNOSIS — M54.9 BILATERAL BACK PAIN, UNSPECIFIED BACK LOCATION, UNSPECIFIED CHRONICITY: ICD-10-CM

## 2017-08-14 DIAGNOSIS — R14.0 ABDOMINAL BLOATING: ICD-10-CM

## 2017-08-14 RX ORDER — METOPROLOL SUCCINATE 50 MG/1
50 TABLET, EXTENDED RELEASE ORAL DAILY
Qty: 30 TAB | Refills: 3 | Status: SHIPPED | OUTPATIENT
Start: 2017-08-14 | End: 2017-08-31 | Stop reason: CLARIF

## 2017-08-14 RX ORDER — LISINOPRIL 20 MG/1
20 TABLET ORAL 2 TIMES DAILY
Qty: 60 TAB | Refills: 3 | Status: CANCELLED | OUTPATIENT
Start: 2017-08-14

## 2017-08-14 RX ORDER — LOSARTAN POTASSIUM 50 MG/1
50 TABLET ORAL 2 TIMES DAILY
Qty: 60 TAB | Refills: 3 | Status: SHIPPED | OUTPATIENT
Start: 2017-08-14 | End: 2018-02-08 | Stop reason: SDUPTHER

## 2017-08-14 RX ORDER — HYDROCHLOROTHIAZIDE 25 MG/1
25 TABLET ORAL DAILY
Qty: 30 TAB | Refills: 3 | Status: SHIPPED | OUTPATIENT
Start: 2017-08-14 | End: 2019-02-25 | Stop reason: SDUPTHER

## 2017-08-14 NOTE — MR AVS SNAPSHOT
Visit Information Date & Time Provider Department Dept. Phone Encounter #  
 8/14/2017  9:30 AM Genny ELKIN Melgar, 7[address] [facility] [phone] [id number] Follow-up Instructions Return in about 1 month (around 9/14/2017). Your Appointments 8/21/2017  1:00 PM  
New Patient with Stevan Kennedy MD  
[address]) Appt Note: $50 cp self pay. ../[facility]; Pt r/s, no insurance- no self pay monies [address] [address] 83 700 [place]  
  
   
 [address] [facility] 88 [address] [place] [address] Upcoming Health Maintenance Date Due Pneumococcal 19-64 Medium Risk (1 of 1 - PPSV23) 12/4/1995 DTaP/Tdap/Td series (1 - Tdap) 12/4/1997 INFLUENZA AGE 9 TO ADULT 8/1/2017 Allergies as of 8/14/2017  Review Complete On: 8/14/2017 By: Genny Melgar MD  
  
 Severity Noted Reaction Type Reactions Lisinopril  08/14/2017    Other (comments) Sore throat Current Immunizations  Never Reviewed No immunizations on file. Not reviewed this visit You Were Diagnosed With   
  
 Codes Comments Chronic pain of left ankle    -  Primary ICD-10-CM: M25.572, E83.74 ICD-9-CM: 719.47, 338.29 Essential hypertension     ICD-10-CM: I10 
ICD-9-CM: 401.9 History of kidney stones     ICD-10-CM: Z87.442 ICD-9-CM: V13.01 Abdominal bloating     ICD-10-CM: R14.0 ICD-9-CM: 787.3 Bilateral back pain, unspecified back location, unspecified chronicity     ICD-10-CM: M54.9 ICD-9-CM: 724.5 Vitals BP Pulse Temp Resp Height(growth percentile) Weight(growth percentile) (!) 156/100 94 96.8 °F (36 °C) (Oral) 16 5' 5\" (1.651 m) 215 lb 12.8 oz (97.9 kg) SpO2 BMI Smoking Status 96% 35.91 kg/m2 Current Every Day Smoker Vitals History BMI and BSA Data Body Mass Index Body Surface Area  
 35.91 kg/m 2 2.12 m 2 Preferred Pharmacy Pharmacy Name Phone [facility] [address], [address] [place] Mel[place] [phone] Your Updated Medication List  
  
   
This list is accurate as of: 8/14/17 10:17 AM.  Always use your most recent med list.  
  
  
  
  
 cyclobenzaprine 10 mg tablet Commonly known as:  FLEXERIL Take 1 Tab by mouth three (3) times daily as needed for Muscle Spasm(s). hydroCHLOROthiazide 25 mg tablet Commonly known as:  HYDRODIURIL Take 1 Tab by mouth daily. losartan 50 mg tablet Commonly known as:  COZAAR Take 1 Tab by mouth two (2) times a day. metoprolol succinate 50 mg XL tablet Commonly known as:  TOPROL-XL Take 1 Tab by mouth daily. oxyCODONE-acetaminophen 5-325 mg per tablet Commonly known as:  PERCOCET Take 1 Tab by mouth every eight (8) hours as needed for Pain. Max Daily Amount: 3 Tabs. Prescriptions Sent to Pharmacy Refills  
 hydroCHLOROthiazide (HYDRODIURIL) 25 mg tablet 3 Sig: Take 1 Tab by mouth daily. Class: Normal  
 Pharmacy: [facility] [address] [place] [address], [facility] [address] Ph #: [phone] Route: Oral  
 losartan (COZAAR) 50 mg tablet 3 Sig: Take 1 Tab by mouth two (2) times a day. Class: Normal  
 Pharmacy: [facility] [address] [place] [address], [address] Ph #: [phone] Route: Oral  
 metoprolol succinate (TOPROL-XL) 50 mg XL tablet 3 Sig: Take 1 Tab by mouth daily. Class: Normal  
 Pharmacy: [address] [place] [address], [address] Ph #: [phone] Route: Oral  
  
We Performed the Following REFERRAL TO ORTHOPEDICS [OKR566 Custom] Follow-up Instructions Return in about 1 month (around 9/14/2017). To-Do List   
 08/14/2017 Lab:  LIPID PANEL   
  
 08/14/2017 Lab:  METABOLIC PANEL, COMPREHENSIVE   
  
 08/14/2017 Lab:  URINALYSIS W/ RFLX MICROSCOPIC   
  
 08/15/2017 Imaging:  US ABD COMP Referral Information Referral ID Referred By Referred To  
  
 [id number] [facility] Not Available Visits Status Start Date End Date 1 New Request 8/14/17 8/14/18 If your referral has a status of pending review or denied, additional information will be sent to support the outcome of this decision. Introducing [facility] HEALTH SERVICES! Adenike Nelson introduces Vinylmint patient portal. Now you can access parts of your medical record, email your doctor's office, and request medication refills online. 1. In your internet browser, go to [email or web address] 2. Click on the First Time User? Click Here link in the Sign In box. You will see the New Member Sign Up page. 3. Enter your Vinylmint Access Code exactly as it appears below. You will not need to use this code after youve completed the sign-up process. If you do not sign up before the expiration date, you must request a new code. · Vinylmint Access Code: [email or web address] Expires: 8/22/2017 11:07 AM 
 
4. Enter the last four digits of your Social Security Number (xxxx) and Date of Birth (mm/dd/yyyy) as indicated and click Submit. You will be taken to the next sign-up page. 5. Create a Vinylmint ID. This will be your Vinylmint login ID and cannot be changed, so think of one that is secure and easy to remember. 6. Create a Vinylmint password. You can change your password at any time. 7. Enter your Password Reset Question and Answer. This can be used at a later time if you forget your password. 8. Enter your e-mail address. You will receive e-mail notification when new information is available in [address] 9. Click Sign Up. You can now view and download portions of your medical record. 10. Click the Download Summary menu link to download a portable copy of your medical information. If you have questions, please visit the Frequently Asked Questions section of the Vinylmint website.  Remember, Vinylmint is NOT to be used for urgent needs. For medical emergencies, dial 911. Now available from your iPhone and Android! Please provide this summary of care documentation to your next provider. Your primary care clinician is listed as Torrey Hammer. If you have any questions after today's visit, please call [phone].

## 2017-08-14 NOTE — PROGRESS NOTES
Vanessa Su is a 37 y.o.  male and presents with     Chief Complaint   Patient presents with    Hypertension    Back Pain    Gas    Sore Throat       Pt has been having back pain. Pt says it radiates down both legs. Pt also gives h/ o left ankle injury and has metal in the left foot. He is wondering if it needs to come out. Pt also feels itching to the back of his throat. Pt feels bloated and  Does not feel like eating. Although he has not lost any weight. Pt does have h/o kidney stones. Gris muniz has Crohns. Past Medical History:   Diagnosis Date    Hypertension     Kidney stone      No past surgical history on file. Current Outpatient Prescriptions   Medication Sig    hydroCHLOROthiazide (HYDRODIURIL) 25 mg tablet Take 1 Tab by mouth daily.  losartan (COZAAR) 50 mg tablet Take 1 Tab by mouth two (2) times a day.  metoprolol succinate (TOPROL-XL) 50 mg XL tablet Take 1 Tab by mouth daily.  oxyCODONE-acetaminophen (PERCOCET) 5-325 mg per tablet Take 1 Tab by mouth every eight (8) hours as needed for Pain. Max Daily Amount: 3 Tabs.  cyclobenzaprine (FLEXERIL) 10 mg tablet Take 1 Tab by mouth three (3) times daily as needed for Muscle Spasm(s). No current facility-administered medications for this visit. Health Maintenance   Topic Date Due    Pneumococcal 19-64 Medium Risk (1 of 1 - PPSV23) 12/04/1995    DTaP/Tdap/Td series (1 - Tdap) 12/04/1997    INFLUENZA AGE 9 TO ADULT  08/01/2017       There is no immunization history on file for this patient. No LMP for male patient. Allergies and Intolerances: Allergies   Allergen Reactions    Lisinopril Other (comments)     Sore throat       Family History:   No family history on file. Social History:   He  reports that he has been smoking Cigarettes. He has never used smokeless tobacco.  He  reports that he does not drink alcohol.             Review of Systems:   General: negative for - chills, fatigue, fever, weight change  Psych: negative for - anxiety, depression, irritability or mood swings  ENT: negative for - headaches, hearing change, nasal congestion, oral lesions, sneezing or sore throat  Heme/ Lymph: negative for - bleeding problems, bruising, pallor or swollen lymph nodes  Endo: negative for - hot flashes, polydipsia/polyuria or temperature intolerance  Resp: negative for - cough, shortness of breath or wheezing  CV: negative for - chest pain, edema or palpitations  GI: negative for - abdominal pain, change in bowel habits, constipation, diarrhea or nausea/vomiting  : negative for - dysuria, hematuria, incontinence, pelvic pain or vulvar/vaginal symptoms  MSK: negative for - joint pain, joint swelling or muscle pain, pos for back pain  Neuro: negative for - confusion, headaches, seizures or weakness  Derm: negative for - dry skin, hair changes, rash or skin lesion changes          Physical:   Vitals:   Vitals:    08/14/17 0939 08/14/17 0944   BP: (!) 151/109 (!) 156/100   Pulse: 94    Resp: 16    Temp: 96.8 °F (36 °C)    TempSrc: Oral    SpO2: 96%    Weight: 215 lb 12.8 oz (97.9 kg)    Height: 5' 5\" (1.651 m)            Exam:   HEENT- atraumatic,normocephalic, awake, oriented, well nourished  Neck - supple,no enlarged lymph nodes, no JVD, no thyromegaly  Chest- CTA, no rhonchi, no crackles  Heart- rrr, no murmurs / gallop/rub  Abdomen- soft,BS+,NT, no hepatosplenomegaly  Ext - no c/c/edema   Neuro- no focal deficits. Power 5/5 all extremities  Skin - warm,dry, no obvious rashes.           Review of Data:   LABS:   Lab Results   Component Value Date/Time    WBC 4.9 05/24/2017 09:00 AM    HGB 14.5 05/24/2017 09:00 AM    HCT 42.5 05/24/2017 09:00 AM    PLATELET 934 87/97/0724 09:00 AM     Lab Results   Component Value Date/Time    Sodium 139 05/24/2017 09:00 AM    Potassium 3.9 05/24/2017 09:00 AM    Chloride 106 05/24/2017 09:00 AM    CO2 24 05/24/2017 09:00 AM    Glucose 138 05/24/2017 09:00 AM    BUN 10 05/24/2017 09:00 AM    Creatinine 1.12 05/24/2017 09:00 AM     No results found for: CHOL, CHOLX, CHLST, CHOLV, HDL, LDL, LDLC, DLDLP, TGLX, TRIGL, TRIGP  No results found for: GPT        Impression / Plan:        ICD-10-CM ICD-9-CM    1. Chronic pain of left ankle M25.572 719.47 REFERRAL TO ORTHOPEDICS    G89.29 338.29    2. Essential hypertension I10 401.9 hydroCHLOROthiazide (HYDRODIURIL) 25 mg tablet      losartan (COZAAR) 50 mg tablet      metoprolol succinate (TOPROL-XL) 50 mg XL tablet      LIPID PANEL   3. History of kidney stones Z87.442 V13.01 URINALYSIS W/ RFLX MICROSCOPIC   4. Abdominal bloating R14.0 787.3 US ABD COMP      METABOLIC PANEL, COMPREHENSIVE   5. Bilateral back pain, unspecified back location, unspecified chronicity M54.9 724.5          Explained to patient risk benefits of the medications. Advised patient to stop meds if having any side effects. Pt verbalized understanding of the instructions. I have discussed the diagnosis with the patient and the intended plan as seen in the above orders. The patient has received an after-visit summary and questions were answered concerning future plans. I have discussed medication side effects and warnings with the patient as well. I have reviewed the plan of care with the patient, accepted their input and they are in agreement with the treatment goals. Reviewed plan of care. Patient has provided input and agrees with goals.     Follow-up Disposition: Not on Sahara Arellano MD

## 2017-08-18 ENCOUNTER — HOSPITAL ENCOUNTER (OUTPATIENT)
Dept: ULTRASOUND IMAGING | Age: 42
Discharge: HOME | End: 2017-08-18
Attending: INTERNAL MEDICINE
Payer: MEDICAID

## 2017-08-18 DIAGNOSIS — R14.0 ABDOMINAL BLOATING: ICD-10-CM

## 2017-08-18 PROCEDURE — 76700 US EXAM ABDOM COMPLETE: CPT

## 2017-08-21 ENCOUNTER — OFFICE VISIT (OUTPATIENT)
Dept: SURGERY | Age: 42
End: 2017-08-21

## 2017-08-21 VITALS
TEMPERATURE: 97.6 F | DIASTOLIC BLOOD PRESSURE: 115 MMHG | OXYGEN SATURATION: 98 % | WEIGHT: 213.2 LBS | HEART RATE: 112 BPM | RESPIRATION RATE: 18 BRPM | BODY MASS INDEX: 35.52 KG/M2 | SYSTOLIC BLOOD PRESSURE: 175 MMHG | HEIGHT: 65 IN

## 2017-08-21 DIAGNOSIS — K62.5 RECTAL BLEEDING: Primary | ICD-10-CM

## 2017-08-21 RX ORDER — POLYETHYLENE GLYCOL 3350, SODIUM SULFATE ANHYDROUS, SODIUM BICARBONATE, SODIUM CHLORIDE, POTASSIUM CHLORIDE 236; 22.74; 6.74; 5.86; 2.97 G/4L; G/4L; G/4L; G/4L; G/4L
POWDER, FOR SOLUTION ORAL
Qty: 1 BOTTLE | Refills: 0 | Status: SHIPPED | OUTPATIENT
Start: 2017-08-21 | End: 2017-09-14

## 2017-08-21 NOTE — PATIENT INSTRUCTIONS
If you have any questions or concerns about today's appointment, the verbal and/or written instructions you were given for follow up care, please call our office at [phone].     [facility] - [address]    [phone] office  [phone] fax

## 2017-08-21 NOTE — MR AVS SNAPSHOT
Visit Information Date & Time Provider Department Dept. Phone Encounter #  
 8/21/2017  1:00 PM Suha Broussard MD [facility] [phone] [id number] Your Appointments 9/14/2017  9:30 AM  
Follow Up with Johnny Scherer MD  
[facility]) Appt Note: Return in 1 month [address] [facility] [address]  
  
   
 [address] [facility] Upcoming Health Maintenance Date Due Pneumococcal 19-64 Medium Risk (1 of 1 - PPSV23) 12/4/1995 DTaP/Tdap/Td series (1 - Tdap) 12/4/1997 INFLUENZA AGE 9 TO ADULT 8/1/2017 Allergies as of 8/21/2017  Review Complete On: 8/21/2017 By: Theo Dyer Severity Noted Reaction Type Reactions Lisinopril  08/14/2017    Other (comments) Sore throat Current Immunizations  Never Reviewed No immunizations on file. Not reviewed this visit Vitals BP Pulse Temp Resp Height(growth percentile) Weight(growth percentile) (!) 175/115 (BP 1 Location: Right arm, BP Patient Position: Sitting) (!) 112 97.6 °F (36.4 °C) (Oral) 18 5' 5\" (1.651 m) 213 lb 3.2 oz (96.7 kg) SpO2 BMI Smoking Status 98% 35.48 kg/m2 Current Every Day Smoker BMI and BSA Data Body Mass Index Body Surface Area  
 35.48 kg/m 2 2.11 m 2 Preferred Pharmacy Pharmacy Name Phone [facility] [address], [address] [facility] [phone] Your Updated Medication List  
  
   
This list is accurate as of: 8/21/17  1:34 PM.  Always use your most recent med list.  
  
  
  
  
 cyclobenzaprine 10 mg tablet Commonly known as:  FLEXERIL Take 1 Tab by mouth three (3) times daily as needed for Muscle Spasm(s). hydroCHLOROthiazide 25 mg tablet Commonly known as:  HYDRODIURIL Take 1 Tab by mouth daily. losartan 50 mg tablet Commonly known as:  COZAAR  
 Take 1 Tab by mouth two (2) times a day. metoprolol succinate 50 mg XL tablet Commonly known as:  TOPROL-XL Take 1 Tab by mouth daily. oxyCODONE-acetaminophen 5-325 mg per tablet Commonly known as:  PERCOCET Take 1 Tab by mouth every eight (8) hours as needed for Pain. Max Daily Amount: 3 Tabs. PEG 3350-Electrolytes 236-22.74-6.74 -5.86 gram suspension Commonly known as:  GO-LYTELY Take as directed for bowel prep  Indications: BOWEL EVACUATION Prescriptions Sent to Pharmacy Refills PEG 3350-Electrolytes (GO-LYTELY) 236-22.74-6.74 -5.86 gram suspension 0 Sig: Take as directed for bowel prep  Indications: BOWEL EVACUATION Class: Normal  
 Pharmacy: [id number], [address] Ph #: [phone] Patient Instructions If you have any questions or concerns about today's appointment, the verbal and/or written instructions you were given for follow up care, please call our office at [phone]. [facility] - [address], [address] 
 
[phone] office [phone] fax Introducing [facility] & HEALTH SERVICES! [facility] introduces [email or web address] patient portal. Now you can access parts of your medical record, email your doctor's office, and request medication refills online. 1. In your internet browser, go to [email or web address] 2. Click on the First Time User? Click Here link in the Sign In box. You will see the New Member Sign Up page. 3. Enter your [email or web address] Access Code exactly as it appears below. You will not need to use this code after youve completed the sign-up process. If you do not sign up before the expiration date, you must request a new code. · [email or web address] Access Code: [email or web address] Expires: 8/22/2017 11:07 AM 
 
4.  Enter the last four digits of your Social Security Number (xxxx) and Date of Birth (mm/dd/yyyy) as indicated and click Submit. You will be taken to the next sign-up page. 5. Create a Greycork ID. This will be your Greycork login ID and cannot be changed, so think of one that is secure and easy to remember. 6. Create a Greycork password. You can change your password at any time. 7. Enter your Password Reset Question and Answer. This can be used at a later time if you forget your password. 8. Enter your e-mail address. You will receive e-mail notification when new information is available in [address] 9. Click Sign Up. You can now view and download portions of your medical record. 10. Click the Download Summary menu link to download a portable copy of your medical information. If you have questions, please visit the Frequently Asked Questions section of the Greycork website. Remember, [employer]cork is NOT to be used for urgent needs. For medical emergencies, dial 911. Now available from your iPhone and Android! Please provide this summary of care documentation to your next provider. Your primary care clinician is listed as Dorchester Pace. If you have any questions after today's visit, please call [phone].

## 2017-08-30 ENCOUNTER — TELEPHONE (OUTPATIENT)
Dept: FAMILY MEDICINE CLINIC | Age: 42
End: 2017-08-30

## 2017-08-30 NOTE — TELEPHONE ENCOUNTER
Metoprolol Succinate 50 mg tabs is not approved by insurance. Please rx Atenolol, Atenolol/Chlorthalidone, Bisoprolol/HCTZ, Carvedilol, Lavetalol HCI, Metoprolol Tartrate, Nadolol/Bendroflumethiazide, Propranolol solution/table, Propranolol/HCTZ, Sorine, Sotalol, and Sotalol AF. Please rx one of the alternatives.

## 2017-08-31 DIAGNOSIS — I10 ESSENTIAL HYPERTENSION: Primary | ICD-10-CM

## 2017-08-31 RX ORDER — METOPROLOL TARTRATE 25 MG/1
25 TABLET, FILM COATED ORAL 2 TIMES DAILY
Qty: 60 TAB | Refills: 3 | Status: SHIPPED | OUTPATIENT
Start: 2017-08-31 | End: 2017-09-14 | Stop reason: DRUGHIGH

## 2017-09-14 ENCOUNTER — OFFICE VISIT (OUTPATIENT)
Dept: FAMILY MEDICINE CLINIC | Age: 42
End: 2017-09-14

## 2017-09-14 VITALS
BODY MASS INDEX: 36.82 KG/M2 | RESPIRATION RATE: 15 BRPM | TEMPERATURE: 97 F | WEIGHT: 221 LBS | HEART RATE: 96 BPM | DIASTOLIC BLOOD PRESSURE: 129 MMHG | SYSTOLIC BLOOD PRESSURE: 189 MMHG | OXYGEN SATURATION: 98 % | HEIGHT: 65 IN

## 2017-09-14 DIAGNOSIS — I10 ESSENTIAL HYPERTENSION: Primary | ICD-10-CM

## 2017-09-14 RX ORDER — METOPROLOL TARTRATE 50 MG/1
50 TABLET ORAL 2 TIMES DAILY
Qty: 60 TAB | Refills: 3 | Status: SHIPPED | OUTPATIENT
Start: 2017-09-14 | End: 2019-02-28 | Stop reason: SDUPTHER

## 2017-09-14 NOTE — MR AVS SNAPSHOT
Visit Information Date & Time Provider Department Dept. Phone Encounter #  
 9/14/2017  9:30 AM Selam Tse, [address] [facility] [phone] [id number] Follow-up Instructions Return in about 1 month (around 10/14/2017). Upcoming Health Maintenance Date Due Pneumococcal 19-64 Medium Risk (1 of 1 - PPSV23) 12/4/1995 DTaP/Tdap/Td series (1 - Tdap) 12/4/1997 INFLUENZA AGE 9 TO ADULT 8/1/2017 Allergies as of 9/14/2017  Review Complete On: 9/14/2017 By: Harrison Caal LPN Severity Noted Reaction Type Reactions Lisinopril  08/14/2017    Other (comments) Sore throat Current Immunizations  Never Reviewed No immunizations on file. Not reviewed this visit You Were Diagnosed With   
  
 Codes Comments Essential hypertension    -  Primary ICD-10-CM: I10 
ICD-9-CM: 401.9 Vitals BP Pulse Temp Resp Height(growth percentile) Weight(growth percentile) (!) 189/129 (BP 1 Location: Left arm, BP Patient Position: Sitting) 96 97 °F (36.1 °C) (Oral) 15 5' 5\" (1.651 m) 221 lb (100.2 kg) SpO2 BMI Smoking Status 98% 36.78 kg/m2 Current Every Day Smoker Vitals History BMI and BSA Data Body Mass Index Body Surface Area 36.78 kg/m 2 2.14 m 2 Preferred Pharmacy Pharmacy Name Phone [facility] [address], [address] [place] [address] [phone] Your Updated Medication List  
  
   
This list is accurate as of: 9/14/17 10:44 AM.  Always use your most recent med list.  
  
  
  
  
 hydroCHLOROthiazide 25 mg tablet Commonly known as:  HYDRODIURIL Take 1 Tab by mouth daily. losartan 50 mg tablet Commonly known as:  COZAAR Take 1 Tab by mouth two (2) times a day. metoprolol tartrate 50 mg tablet Commonly known as:  LOPRESSOR Take 1 Tab by mouth two (2) times a day. Prescriptions Sent to Pharmacy Refills metoprolol tartrate (LOPRESSOR) 50 mg tablet 3 Sig: Take 1 Tab by mouth two (2) times a day. Class: Normal  
 Pharmacy: [facility] [address], [address]  #: [phone] Route: Oral  
  
Follow-up Instructions Return in about 1 month (around 10/14/2017). Introducing [facility] & HEALTH SERVICES! [facility] introduces [employer] patient portal. Now you can access parts of your medical record, email your doctor's office, and request medication refills online. 1. In your internet browser, go to [email or web address] 2. Click on the First Time User? Click Here link in the Sign In box. You will see the New Member Sign Up page. 3. Enter your [employer] Access Code exactly as it appears below. You will not need to use this code after youve completed the sign-up process. If you do not sign up before the expiration date, you must request a new code. · [employer] Access Code: [email or web address] Expires: 12/13/2017  9:38 AM 
 
4. Enter the last four digits of your Social Security Number (xxxx) and Date of Birth (mm/dd/yyyy) as indicated and click Submit. You will be taken to the next sign-up page. 5. Create a [employer] ID. This will be your [employer] login ID and cannot be changed, so think of one that is secure and easy to remember. 6. Create a [employer] password. You can change your password at any time. 7. Enter your Password Reset Question and Answer. This can be used at a later time if you forget your password. 8. Enter your e-mail address. You will receive e-mail notification when new information is available in [address] 9. Click Sign Up. You can now view and download portions of your medical record. 10. Click the Download Summary menu link to download a portable copy of your medical information. If you have questions, please visit the Frequently Asked Questions section of the [employer] website.  Remember, [employer] is NOT to be used for urgent needs. For medical emergencies, dial 911. Now available from your iPhone and Android! Please provide this summary of care documentation to your next provider. Your primary care clinician is listed as Therese David. If you have any questions after today's visit, please call [phone].

## 2017-09-14 NOTE — PROGRESS NOTES
Meredith Vega is a 37 y.o.  male and presents with     Chief Complaint   Patient presents with    Hypertension    Rectal Bleeding       Pt is taking losartan and HCTZ but not metoprolol. Pt does get rectal bleeding occasionally , has appt with DR Josette Lopez. No chest pain, SOB,    Past Medical History:   Diagnosis Date    Hypertension     Kidney stone      No past surgical history on file. Current Outpatient Prescriptions   Medication Sig    metoprolol tartrate (LOPRESSOR) 50 mg tablet Take 1 Tab by mouth two (2) times a day.  hydroCHLOROthiazide (HYDRODIURIL) 25 mg tablet Take 1 Tab by mouth daily.  losartan (COZAAR) 50 mg tablet Take 1 Tab by mouth two (2) times a day. No current facility-administered medications for this visit. Health Maintenance   Topic Date Due    Pneumococcal 19-64 Medium Risk (1 of 1 - PPSV23) 12/04/1995    DTaP/Tdap/Td series (1 - Tdap) 12/04/1997    INFLUENZA AGE 9 TO ADULT  08/01/2017       There is no immunization history on file for this patient. No LMP for male patient. Allergies and Intolerances: Allergies   Allergen Reactions    Lisinopril Other (comments)     Sore throat       Family History:   No family history on file. Social History:   He  reports that he has been smoking Cigarettes. He has never used smokeless tobacco.  He  reports that he does not drink alcohol.             Review of Systems:   General: negative for - chills, fatigue, fever, weight change  Psych: negative for - anxiety, depression, irritability or mood swings  ENT: negative for - headaches, hearing change, nasal congestion, oral lesions, sneezing or sore throat  Heme/ Lymph: negative for - bleeding problems, bruising, pallor or swollen lymph nodes  Endo: negative for - hot flashes, polydipsia/polyuria or temperature intolerance  Resp: negative for - cough, shortness of breath or wheezing  CV: negative for - chest pain, edema or palpitations  GI: negative for - abdominal pain, change in bowel habits, constipation, diarrhea or nausea/vomiting  : negative for - dysuria, hematuria, incontinence, pelvic pain or vulvar/vaginal symptoms  MSK: negative for - joint pain, joint swelling or muscle pain  Neuro: negative for - confusion, headaches, seizures or weakness  Derm: negative for - dry skin, hair changes, rash or skin lesion changes          Physical:   Vitals:   Vitals:    09/14/17 1000   BP: (!) 189/129   Pulse: 96   Resp: 15   Temp: 97 °F (36.1 °C)   TempSrc: Oral   SpO2: 98%   Weight: 221 lb (100.2 kg)   Height: 5' 5\" (1.651 m)           Exam:   HEENT- atraumatic,normocephalic, awake, oriented, well nourished  Neck - supple,no enlarged lymph nodes, no JVD, no thyromegaly  Chest- CTA, no rhonchi, no crackles  Heart- rrr, no murmurs / gallop/rub  Abdomen- soft,BS+,NT, no hepatosplenomegaly  Ext - no c/c/edema   Neuro- no focal deficits. Power 5/5 all extremities  Skin - warm,dry, no obvious rashes. Review of Data:   LABS:   Lab Results   Component Value Date/Time    WBC 4.9 05/24/2017 09:00 AM    HGB 14.5 05/24/2017 09:00 AM    HCT 42.5 05/24/2017 09:00 AM    PLATELET 583 37/42/2520 09:00 AM     Lab Results   Component Value Date/Time    Sodium 139 05/24/2017 09:00 AM    Potassium 3.9 05/24/2017 09:00 AM    Chloride 106 05/24/2017 09:00 AM    CO2 24 05/24/2017 09:00 AM    Glucose 138 05/24/2017 09:00 AM    BUN 10 05/24/2017 09:00 AM    Creatinine 1.12 05/24/2017 09:00 AM     No results found for: CHOL, CHOLX, CHLST, CHOLV, HDL, LDL, LDLC, DLDLP, TGLX, TRIGL, TRIGP  No results found for: GPT        Impression / Plan:        ICD-10-CM ICD-9-CM    1. Essential hypertension I10 401.9 metoprolol tartrate (LOPRESSOR) 50 mg tablet     Low salt diet. Blood presure monitor. Explained to patient risk benefits of the medications. Advised patient to stop meds if having any side effects. Pt verbalized understanding of the instructions.     I have discussed the diagnosis with the patient and the intended plan as seen in the above orders. The patient has received an after-visit summary and questions were answered concerning future plans. I have discussed medication side effects and warnings with the patient as well. I have reviewed the plan of care with the patient, accepted their input and they are in agreement with the treatment goals. Reviewed plan of care. Patient has provided input and agrees with goals.     Follow-up Disposition: Not on Yoselyn Jarrett MD

## 2017-09-14 NOTE — PROGRESS NOTES
1. Have you been to the ER, urgent care clinic since your last visit? Hospitalized since your last visit? No    2. Have you seen or consulted any other health care providers outside of the [address] [place] [address] since your last visit? Include any pap smears or colon screening.  No

## 2017-09-15 ENCOUNTER — HOSPITAL ENCOUNTER (OUTPATIENT)
Age: 42
Setting detail: OUTPATIENT SURGERY
Discharge: HOME | End: 2017-09-15
Attending: COLON & RECTAL SURGERY | Admitting: COLON & RECTAL SURGERY
Payer: MEDICAID

## 2017-09-15 VITALS
HEART RATE: 95 BPM | HEIGHT: 65 IN | WEIGHT: 215 LBS | BODY MASS INDEX: 35.82 KG/M2 | SYSTOLIC BLOOD PRESSURE: 168 MMHG | OXYGEN SATURATION: 94 % | DIASTOLIC BLOOD PRESSURE: 123 MMHG | RESPIRATION RATE: 16 BRPM | TEMPERATURE: 98 F

## 2017-09-15 PROCEDURE — 77030031670 HC DEV INFL ENDOTEK BIG60 MRTM -B: Performed by: COLON & RECTAL SURGERY

## 2017-09-15 PROCEDURE — G0500 MOD SEDAT ENDO SERVICE >5YRS: HCPCS | Performed by: COLON & RECTAL SURGERY

## 2017-09-15 PROCEDURE — 74011250636 HC RX REV CODE- 250/636: Performed by: COLON & RECTAL SURGERY

## 2017-09-15 PROCEDURE — 76040000007: Performed by: COLON & RECTAL SURGERY

## 2017-09-15 PROCEDURE — 74011250636 HC RX REV CODE- 250/636

## 2017-09-15 RX ORDER — NALOXONE HYDROCHLORIDE 0.4 MG/ML
0.4 INJECTION, SOLUTION INTRAMUSCULAR; INTRAVENOUS; SUBCUTANEOUS
Status: DISCONTINUED | OUTPATIENT
Start: 2017-09-15 | End: 2017-09-15 | Stop reason: HOSPADM

## 2017-09-15 RX ORDER — DEXTROMETHORPHAN/PSEUDOEPHED 2.5-7.5/.8
1.2 DROPS ORAL
Status: DISCONTINUED | OUTPATIENT
Start: 2017-09-15 | End: 2017-09-15 | Stop reason: HOSPADM

## 2017-09-15 RX ORDER — ATROPINE SULFATE 0.1 MG/ML
0.5 INJECTION INTRAVENOUS
Status: DISCONTINUED | OUTPATIENT
Start: 2017-09-15 | End: 2017-09-15 | Stop reason: HOSPADM

## 2017-09-15 RX ORDER — MIDAZOLAM HYDROCHLORIDE 1 MG/ML
INJECTION, SOLUTION INTRAMUSCULAR; INTRAVENOUS
Status: DISCONTINUED
Start: 2017-09-15 | End: 2017-09-15 | Stop reason: HOSPADM

## 2017-09-15 RX ORDER — AMOXICILLIN 500 MG/1
TABLET, FILM COATED ORAL
COMMUNITY
End: 2017-11-22

## 2017-09-15 RX ORDER — MIDAZOLAM HYDROCHLORIDE 1 MG/ML
.25-1 INJECTION, SOLUTION INTRAMUSCULAR; INTRAVENOUS
Status: DISCONTINUED | OUTPATIENT
Start: 2017-09-15 | End: 2017-09-15 | Stop reason: HOSPADM

## 2017-09-15 RX ORDER — EPINEPHRINE 0.1 MG/ML
1 INJECTION INTRACARDIAC; INTRAVENOUS
Status: DISCONTINUED | OUTPATIENT
Start: 2017-09-15 | End: 2017-09-15 | Stop reason: HOSPADM

## 2017-09-15 RX ORDER — HYDROCODONE BITARTRATE AND ACETAMINOPHEN 7.5; 3 MG/1; MG/1
TABLET ORAL
COMMUNITY
End: 2017-10-31 | Stop reason: CLARIF

## 2017-09-15 RX ORDER — SODIUM CHLORIDE 9 MG/ML
50 INJECTION, SOLUTION INTRAVENOUS CONTINUOUS
Status: DISCONTINUED | OUTPATIENT
Start: 2017-09-15 | End: 2017-09-15 | Stop reason: HOSPADM

## 2017-09-15 RX ORDER — FLUMAZENIL 0.1 MG/ML
0.2 INJECTION INTRAVENOUS
Status: DISCONTINUED | OUTPATIENT
Start: 2017-09-15 | End: 2017-09-15 | Stop reason: HOSPADM

## 2017-09-15 RX ORDER — MIDAZOLAM HYDROCHLORIDE 1 MG/ML
INJECTION, SOLUTION INTRAMUSCULAR; INTRAVENOUS AS NEEDED
Status: DISCONTINUED | OUTPATIENT
Start: 2017-09-15 | End: 2017-09-15 | Stop reason: HOSPADM

## 2017-09-15 RX ORDER — SODIUM CHLORIDE 0.9 % (FLUSH) 0.9 %
5-10 SYRINGE (ML) INJECTION AS NEEDED
Status: DISCONTINUED | OUTPATIENT
Start: 2017-09-15 | End: 2017-09-15 | Stop reason: HOSPADM

## 2017-09-15 NOTE — INTERVAL H&P NOTE
H&P Update:  Samantha Yuan was seen and examined. History and physical has been reviewed. The patient has been examined.  There have been no significant clinical changes since the completion of the originally dated History and Physical.    Signed By: Dotty Nuñez MD     September 15, 2017 12:20 PM

## 2017-09-15 NOTE — PROCEDURES
Colonoscopy Procedure Note      Dwaine Bautista  1975  [id number]                Date of Procedure: 9/15/2017    Indications:    Hematochezia/melena     Preoperative diagnosis: rectal bleeding  k62.5      Postoperative diagnosis: diverticulosis, hemorrhoid disease    Title of Procedure:  Colonoscopy, diagnostic    :  Westley Ramires MD    Assistant(s): Endoscopy Technician-1: Anastasia Shelby  Endoscopy RN-1: Conner Akers RN    Referring Source:  [id number] Melgar MD    Sedation:  Demerol 50 mg IV,  Versed 4 mg IV      ASA Class: ASA 2 - Mild systemic disease       Procedure Details:    Prior to the procedure, a history and physical were performed. The patients medications, allergies and sensitivities were reviewed and all questions were answered. After informed consent was obtained for the procedure, with all risks and benefits of procedure explained. The patient was taken to the endoscopy suite and placed in the left lateral decubitus position. Patient identification and proposed procedure were verified prior to the procedure by the nurse and I. Following the  satisfactory administration of sedation,  the anus was inspected and appeared abnormal with findings of prolapsed external and internal hemorrhoid disease in the right anterior and posterior quadrants. Digital rectal examination revealed increased sphincter tone and squeeze pressure. Palpation revealed No Masses. Next the Olympus video colonoscope was introduced through the anus and advanced to cecum, which was identified by the ileocecal valve and appendiceal orifice, terminal ileum. The quality of preparation was good. The terminal ileum was visualized. The colonoscope was then slowly withdrawn and the mucosa carefully examined for any abnormalities. Cecal withdrawl time was greater than six minutes. The patient tolerated the procedure well.       Findings:   Rectum: normal  Sigmoid: normal  Descending Colon: normal  Transverse Colon: mild diverticulosis; Ascending Colon: mild diverticulosis; Cecum: mild diverticulosis; Terminal Ileum: normal    Interventions:  none    Specimen Removed: * No specimens in log *     Complications: None. EBL:  None. Impression:    diverticulosis, hemorrhoid disease    Recommendations: -Follow up in the office as scheduled. -Repeat colonoscopy in 10 years   Resume normal medication(s). Discharge Disposition:  Home in the company of a  when able to ambulate.         Lyle Hernandez MD, FACS, FASCRS  Colon and Rectal Surgery  [facility]  Office [phone]  Fax     [phone]  9/15/2017  1:56 PM       [facility]  [address] [place] [address]

## 2017-09-15 NOTE — DISCHARGE INSTRUCTIONS
Colonoscopy Discharge Instructions       [facility]  [id number]  1975      COLONOSCOPY FINDINGS:  Your colonoscopy showed: 1. Moderate to severe hemorrhoid disease. 2. Mild diverticulosis of the right colon. 3. Normal examination otherwise. FOLLOW UP RECOMMENDATIONS:   Dr. Corey Nageotte recommends your next colonoscopy in 10 years. Please follow up with Dr. Corey Nageotte in clinic as recommenced. DISCOMFORT:  If you have redness at your IV site- apply warm compress to area; if redness or soreness persist- contact your physician  There may be a slight amount of blood passed from the rectum, more than a teaspoon of bright red blood is not expected - contact your physician  Gaseous discomfort is common- walking, belching will help relieve any gas pains. If discomfort persist- contact your physician    DIET:   High fiber diet. ACTIVITY:  You may resume your normal daily activities, however, it is recommended that you spend the remainder of the day resting - avoiding any strenuous activities. You may not operate a vehicle for 24 hours  You may not engage in an occupation involving machinery or appliances for rest of today  You may not drink alcoholic beverages for at least 24 hours  Avoid making any critical decisions for at least 24 hour    CALL M.D.   ANY SIGN OF:   Increasing pain, nausea, vomiting  Abdominal distension (swelling)  New increased bleeding   Fever or chills  Pain in chest area or shortness of breath      Aldo Tran MD, FACS, FASCRS  Colon and Rectal Surgery  [facility]  Office [phone]  Fax     [phone] SUMMARY from Nurse    The following personal items are in your possession at time of discharge:                                    PATIENT INSTRUCTIONS:    After general anesthesia or intravenous sedation, for 24 hours or while taking prescription Narcotics:  · Limit your activities  · Do not drive and operate hazardous machinery  · Do not make important personal or business decisions  · Do  not drink alcoholic beverages  · If you have not urinated within 8 hours after discharge, please contact your surgeon on call. Report the following to your surgeon:  · Excessive pain, swelling, redness or odor of or around the surgical area  · Temperature over 100.5  · Nausea and vomiting lasting longer than 4 hours or if unable to take medications  · Any signs of decreased circulation or nerve impairment to extremity: change in color, persistent  numbness, tingling, coldness or increase pain  · Any questions        What to do at Home:      These are general instructions for a healthy lifestyle:    No smoking/ No tobacco products/ Avoid exposure to second hand smoke    Surgeon General's Warning:  Quitting smoking now greatly reduces serious risk to your health. Obesity, smoking, and sedentary lifestyle greatly increases your risk for illness    A healthy diet, regular physical exercise & weight monitoring are important for maintaining a healthy lifestyle    You may be retaining fluid if you have a history of heart failure or if you experience any of the following symptoms:  Weight gain of 3 pounds or more overnight or 5 pounds in a week, increased swelling in our hands or feet or shortness of breath while lying flat in bed. Please call your doctor as soon as you notice any of these symptoms; do not wait until your next office visit. Recognize signs and symptoms of STROKE:    F-face looks uneven    A-arms unable to move or move unevenly    S-speech slurred or non-existent    T-time-call 911 as soon as signs and symptoms begin-DO NOT go       Back to bed or wait to see if you get better-TIME IS BRAIN. Warning Signs of HEART ATTACK     Call 911 if you have these symptoms:   Chest discomfort.  Most heart attacks involve discomfort in the center of the chest that lasts more than a few minutes, or that goes away and comes back. It can feel like uncomfortable pressure, squeezing, fullness, or pain.  Discomfort in other areas of the upper body. Symptoms can include pain or discomfort in one or both arms, the back, neck, jaw, or stomach.  Shortness of breath with or without chest discomfort.  Other signs may include breaking out in a cold sweat, nausea, or lightheadedness. Don't wait more than five minutes to call 911 - MINUTES MATTER! Fast action can save your life. Calling 911 is almost always the fastest way to get lifesaving treatment. Emergency Medical Services staff can begin treatment when they arrive -- up to an hour sooner than if someone gets to the hospital by car. The discharge information has been reviewed with the patient. The patient verbalized understanding. Discharge medications reviewed with the patient and appropriate educational materials and side effects teaching were provided. Patient armband removed and given to patient to take home.   Patient was informed of the privacy risks if armband lost or stolen

## 2017-09-15 NOTE — IP AVS SNAPSHOT
[address] [place] [address] [address] 
[phone] Patient: Abilio Ríos MRN: [id number] :1975 You are allergic to the following Allergen Reactions Lisinopril Other (comments) Sore throat Recent Documentation Height Weight BMI Smoking Status 1.651 m 97.5 kg 35.78 kg/m2 Current Every Day Smoker Emergency Contacts Name Discharge Info Relation Home Work Mobile Sweta Marques DISCHARGE CAREGIVER [3] Girlfriend [18]   [phone] About your hospitalization You were admitted on:  September 15, 2017 You last received care in the:  [facility] PHASE 2 RECOVERY You were discharged on:  September 15, 2017 Unit phone number:  [phone] Why you were hospitalized Your primary diagnosis was:  Not on File Providers Seen During Your Hospitalizations Provider Role Specialty Primary office phone Tunde Kirkland MD Attending Provider Colon and Rectal Surgery [phone] Your Primary Care Physician (PCP) Primary Care Physician Office Phone Office Fax [facility] [phone] Follow-up Information Follow up With Details Comments Contact Info Tunde Kirkland MD Schedule an appointment as soon as possible for a visit in 2 week(s) Discuss possible surgery [id number] [place] [phone] Your Appointments 2017 10:30 AM EDT Office Visit with Rodney Franklin MD  
[facility] [address] [place] [address]) [address] [facility] [address] [facility] [id number]  
[phone] Current Discharge Medication List  
  
CONTINUE these medications which have NOT CHANGED Dose & Instructions Dispensing Information Comments Morning Noon Evening Bedtime  
 amoxicillin 500 mg Tab Your last dose was: Your next dose is: Take  by mouth. Refills:  0  
     
   
   
   
  
 hydroCHLOROthiazide 25 mg tablet Commonly known as:  HYDRODIURIL Your last dose was: Your next dose is:    
   
   
 Dose:  25 mg Take 1 Tab by mouth daily. Quantity:  30 Tab Refills:  3 HYDROcodone-acetaminophen 7.5-300 mg tablet Commonly known as:  Bhavana Shine Your last dose was: Your next dose is: Take  by mouth. Refills:  0  
     
   
   
   
  
 losartan 50 mg tablet Commonly known as:  COZAAR Your last dose was: Your next dose is:    
   
   
 Dose:  50 mg Take 1 Tab by mouth two (2) times a day. Quantity:  60 Tab Refills:  3  
     
   
   
   
  
 metoprolol tartrate 50 mg tablet Commonly known as:  LOPRESSOR Your last dose was: Your next dose is:    
   
   
 Dose:  50 mg Take 1 Tab by mouth two (2) times a day. Quantity:  60 Tab Refills:  3 Discharge Instructions Colonoscopy Discharge Instructions Kishore Thompson [id number] 
1975 COLONOSCOPY FINDINGS: 
Your colonoscopy showed: 1. Moderate to severe hemorrhoid disease. 2. Mild diverticulosis of the right colon. 3. Normal examination otherwise. FOLLOW UP RECOMMENDATIONS: 
 Dr. Allie Yuan recommends your next colonoscopy in 10 years. Please follow up with Dr. Allie Yuan in clinic as recommenced. DISCOMFORT: 
If you have redness at your IV site- apply warm compress to area; if redness or soreness persist- contact your physician There may be a slight amount of blood passed from the rectum, more than a teaspoon of bright red blood is not expected - contact your physician Gaseous discomfort is common- walking, belching will help relieve any gas pains. If discomfort persist- contact your physician DIET: 
 High fiber diet.  
  
ACTIVITY: 
You may resume your normal daily activities, however, it is recommended that you spend the remainder of the day resting - avoiding any strenuous activities. You may not operate a vehicle for 24 hours You may not engage in an occupation involving machinery or appliances for rest of today You may not drink alcoholic beverages for at least 24 hours Avoid making any critical decisions for at least 24 hour CALL M.D. ANY SIGN OF: Increasing pain, nausea, vomiting Abdominal distension (swelling) New increased bleeding Fever or chills Pain in chest area or shortness of breath Diane Gupta MD, FACS, FASCRS Colon and Rectal Surgery [facility] Office [phone] Fax     [phone] DISCHARGE SUMMARY from Nurse The following personal items are in your possession at time of discharge: 
 
  
  
  
  
  
  
  
  
 
 
 
 
PATIENT INSTRUCTIONS: 
 
After general anesthesia or intravenous sedation, for 24 hours or while taking prescription Narcotics: · Limit your activities · Do not drive and operate hazardous machinery · Do not make important personal or business decisions · Do  not drink alcoholic beverages · If you have not urinated within 8 hours after discharge, please contact your surgeon on call. Report the following to your surgeon: 
· Excessive pain, swelling, redness or odor of or around the surgical area · Temperature over 100.5 · Nausea and vomiting lasting longer than 4 hours or if unable to take medications · Any signs of decreased circulation or nerve impairment to extremity: change in color, persistent  numbness, tingling, coldness or increase pain · Any questions What to do at Home: These are general instructions for a healthy lifestyle: No smoking/ No tobacco products/ Avoid exposure to second hand smoke Surgeon General's Warning:  Quitting smoking now greatly reduces serious risk to your health. Obesity, smoking, and sedentary lifestyle greatly increases your risk for illness A healthy diet, regular physical exercise & weight monitoring are important for maintaining a healthy lifestyle You may be retaining fluid if you have a history of heart failure or if you experience any of the following symptoms:  Weight gain of 3 pounds or more overnight or 5 pounds in a week, increased swelling in our hands or feet or shortness of breath while lying flat in bed. Please call your doctor as soon as you notice any of these symptoms; do not wait until your next office visit. Recognize signs and symptoms of STROKE: 
 
F-face looks uneven A-arms unable to move or move unevenly S-speech slurred or non-existent T-time-call 911 as soon as signs and symptoms begin-DO NOT go Back to bed or wait to see if you get better-TIME IS BRAIN. Warning Signs of HEART ATTACK Call 911 if you have these symptoms: 
? Chest discomfort. Most heart attacks involve discomfort in the center of the chest that lasts more than a few minutes, or that goes away and comes back. It can feel like uncomfortable pressure, squeezing, fullness, or pain. ? Discomfort in other areas of the upper body. Symptoms can include pain or discomfort in one or both arms, the back, neck, jaw, or stomach. ? Shortness of breath with or without chest discomfort. ? Other signs may include breaking out in a cold sweat, nausea, or lightheadedness. Don't wait more than five minutes to call [address]! Fast action can save your life. Calling 911 is almost always the fastest way to get lifesaving treatment. Emergency Medical Services staff can begin treatment when they arrive  up to an hour sooner than if someone gets to the hospital by car. The discharge information has been reviewed with the patient. The patient verbalized understanding. Discharge medications reviewed with the patient and appropriate educational materials and side effects teaching were provided. Patient armband removed and given to patient to take home. Patient was informed of the privacy risks if armband lost or stolen Discharge Orders None Introducing [facility]! Edi Dove introduces [employer] patient portal. Now you can access parts of your medical record, email your doctor's office, and request medication refills online. 1. In your internet browser, go to [email or web address] 2. Click on the First Time User? Click Here link in the Sign In box. You will see the New Member Sign Up page. 3. Enter your [employer] Access Code exactly as it appears below. You will not need to use this code after youve completed the sign-up process. If you do not sign up before the expiration date, you must request a new code. · The [employer]aze Access Code: [email or web address] Expires: 12/13/2017  9:38 AM 
 
4. Enter the last four digits of your Social Security Number (xxxx) and Date of Birth (mm/dd/yyyy) as indicated and click Submit. You will be taken to the next sign-up page. 5. Create a The Blaze ID. This will be your The Blaze login ID and cannot be changed, so think of one that is secure and easy to remember. 6. Create a [employer] password. You can change your password at any time. 7. Enter your Password Reset Question and Answer. This can be used at a later time if you forget your password. 8. Enter your e-mail address. You will receive e-mail notification when new information is available in [address] 9. Click Sign Up. You can now view and download portions of your medical record. 10. Click the Download Summary menu link to download a portable copy of your medical information. If you have questions, please visit the Frequently Asked Questions section of the [employer] website. Remember, The [employer]aze is NOT to be used for urgent needs. For medical emergencies, dial 911. Now available from your iPhone and Android! General Information Please provide this summary of care documentation to your next provider. Patient Signature:  ____________________________________________________________ Date:  ____________________________________________________________  
  
Radha Lambing Provider Signature:  ____________________________________________________________ Date:  ____________________________________________________________

## 2017-09-15 NOTE — H&P (VIEW-ONLY)
[facility]  [id number] [place] [address], [facility]5 [facility]        Colon and Rectal Surgery    Subjective:      Eduard Alegria is a 37 y.o. male who presents for colonoscopy consultation for repeated episodes of bright red rectal bleeding often mixed with stool. This has been on and off for the past 15 years. The patient denies any anorectal pain, change in bowel habits, weight changes, nor any abdominal pain. Patient denies constipation, vomiting, diarrhea, mucousy stools, loss of appetite, reflux and nausea. There is not a family history of colon cancer. His sister has history of Crohn's disease. Patient Active Problem List    Diagnosis Date Noted    History of kidney stones 08/14/2017    Essential hypertension 08/14/2017    Inguinal hernia of left side without obstruction or gangrene 06/01/2017     Past Medical History:   Diagnosis Date    Hypertension     Kidney stone       No past surgical history on file. Social History   Substance Use Topics    Smoking status: Current Every Day Smoker     Types: Cigarettes    Smokeless tobacco: Never Used    Alcohol use No      Comment: ocassionally      No family history on file. Prior to Admission medications    Medication Sig Start Date End Date Taking? Authorizing Provider   PEG 3350-Electrolytes (GO-LYTELY) 236-22.74-6.74 -5.86 gram suspension Take as directed for bowel prep  Indications: BOWEL EVACUATION 8/21/17  Yes Ej Lanza MD   hydroCHLOROthiazide (HYDRODIURIL) 25 mg tablet Take 1 Tab by mouth daily. 8/14/17  Yes Mariaa Connor MD   losartan (COZAAR) 50 mg tablet Take 1 Tab by mouth two (2) times a day. 8/14/17  Yes Mariaa Connor MD   metoprolol succinate (TOPROL-XL) 50 mg XL tablet Take 1 Tab by mouth daily. 8/14/17  Yes Mariaa Connor MD   cyclobenzaprine (FLEXERIL) 10 mg tablet Take 1 Tab by mouth three (3) times daily as needed for Muscle Spasm(s).  6/1/17  Yes Mariaa Connor MD oxyCODONE-acetaminophen (PERCOCET) 5-325 mg per tablet Take 1 Tab by mouth every eight (8) hours as needed for Pain. Max Daily Amount: 3 Tabs. 6/15/17   Yoselin Mcmullen MD     Current Outpatient Prescriptions   Medication Sig    PEG 3350-Electrolytes (GO-LYTELY) 236-22.74-6.74 -5.86 gram suspension Take as directed for bowel prep  Indications: BOWEL EVACUATION    hydroCHLOROthiazide (HYDRODIURIL) 25 mg tablet Take 1 Tab by mouth daily.  losartan (COZAAR) 50 mg tablet Take 1 Tab by mouth two (2) times a day.  metoprolol succinate (TOPROL-XL) 50 mg XL tablet Take 1 Tab by mouth daily.  cyclobenzaprine (FLEXERIL) 10 mg tablet Take 1 Tab by mouth three (3) times daily as needed for Muscle Spasm(s).  oxyCODONE-acetaminophen (PERCOCET) 5-325 mg per tablet Take 1 Tab by mouth every eight (8) hours as needed for Pain. Max Daily Amount: 3 Tabs. No current facility-administered medications for this visit. Allergies   Allergen Reactions    Lisinopril Other (comments)     Sore throat        Review of Systems:    A comprehensive review of systems was negative except for that written in the History of Present Illness. Objective:     Visit Vitals    BP (!) 175/115 (BP 1 Location: Right arm, BP Patient Position: Sitting)    Pulse (!) 112    Temp 97.6 °F (36.4 °C) (Oral)    Resp 18    Ht 5' 5\" (1.651 m)    Wt 96.7 kg (213 lb 3.2 oz)    SpO2 98%    BMI 35.48 kg/m2        Physical Exam:   GENERAL: alert, cooperative, no distress, appears stated age  LYMPHATIC: Cervical, supraclavicular, and axillary nodes normal.   THROAT & NECK: normal and no erythema or exudates noted. LUNG: clear to auscultation bilaterally  HEART: regular rate and rhythm, S1, S2 normal, no murmur, click, rub or gallop  ABDOMEN: soft, non-tender.  Bowel sounds normal. No masses,  no organomegaly  EXTREMITIES:  extremities normal, atraumatic, no cyanosis or edema  SKIN: Normal.  NEUROLOGIC: negative  PSYCHIATRIC: non focal Assessment:     Sanket Ojeda is a 37 y.o. male who requires colonoscopy for chronic rectal bleeding symptoms. Plan:     1. I recommend proceeding with colonoscopy. The patient was in full agreement and was eager to proceed. 2. I discussed the details of the colonoscopy procedure. The risks of colonoscopy were discussed including colon injury/perforation, anesthesia issues, bleeding, and the possibility of incomplete examination. The patient was willing to accept these risks and proceed with the examination. All questions were answered to the patient's satisfaction. 3. The patient was provided with the instructions in preparation for the colonoscopy procedure including the bowel prep recommendations. Thank you for allowing me to participate in the patient's care.          Martín Madrigal MD, FACS, FASCRS  Colon and Rectal Surgery  [facility]  Office [phone]  Fax     [phone]  8/21/2017  4:26 PM

## 2017-10-16 ENCOUNTER — OFFICE VISIT (OUTPATIENT)
Dept: SURGERY | Age: 42
End: 2017-10-16

## 2017-10-16 VITALS
HEIGHT: 65 IN | RESPIRATION RATE: 18 BRPM | WEIGHT: 214 LBS | TEMPERATURE: 98 F | DIASTOLIC BLOOD PRESSURE: 91 MMHG | SYSTOLIC BLOOD PRESSURE: 174 MMHG | OXYGEN SATURATION: 96 % | BODY MASS INDEX: 35.65 KG/M2 | HEART RATE: 95 BPM

## 2017-10-16 DIAGNOSIS — K64.8 INTERNAL AND EXTERNAL PROLAPSED HEMORRHOIDS: Primary | ICD-10-CM

## 2017-10-16 DIAGNOSIS — K62.5 RECTAL BLEEDING: ICD-10-CM

## 2017-10-16 NOTE — PROGRESS NOTES
[facility]  [address] [address], [facility]5 [facility]            Colon and Rectal Surgery    Patient: Elise Thrasher  MRN: [id number]  SSN: [id number]   YOB: 1975  Age: 37 y.o. Sex: male       Elise Thrasher is a 37 y.o. male who presented for colonoscopy exam for repeated episodes of bright red rectal bleeding often mixed with stool. This has been on and off for the past 15 years. He underwent a colonoscopy exam on 9/15/2017 which showed symptomatic prolapsed external and internal hemorrhoid disease in the right anterior and posterior quadrants as well mild diverticulosis of the left colon. The patient wants to proceed with the surgical hemorrhoidectomy. Past Medical History:   Diagnosis Date    Hypertension     Kidney stone      Past Surgical History:   Procedure Laterality Date    COLONOSCOPY N/A 9/15/2017    COLONOSCOPY performed by Jagjit Trivedi MD at [address] [place] [address]      left ankle, hardware in place     Allergies   Allergen Reactions    Lisinopril Other (comments)     Sore throat     Current Outpatient Prescriptions   Medication Sig Dispense Refill    HYDROcodone-acetaminophen (XODOL) 7.5-300 mg tablet Take  by mouth.  amoxicillin 500 mg tab Take  by mouth.  metoprolol tartrate (LOPRESSOR) 50 mg tablet Take 1 Tab by mouth two (2) times a day. 60 Tab 3    hydroCHLOROthiazide (HYDRODIURIL) 25 mg tablet Take 1 Tab by mouth daily. 30 Tab 3    losartan (COZAAR) 50 mg tablet Take 1 Tab by mouth two (2) times a day.  60 Tab 3         Physical Examination    Vitals:    10/16/17 1438   BP: (!) 174/91   Pulse: 95   Resp: 18   Temp: 98 °F (36.7 °C)   TempSrc: Oral   SpO2: 96%   Weight: 97.1 kg (214 lb)   Height: 5' 5\" (1.651 m)        Physical Exam:   GENERAL: alert, cooperative, no distress, appears stated age  LUNG: clear to auscultation bilaterally  HEART: regular rate and rhythm, S1, S2 normal, no murmur, click, rub or gallop  ABDOMEN: soft, non-tender. Bowel sounds normal. No masses,  no organomegaly  EXTREMITIES:  extremities normal, atraumatic, no cyanosis or edema      Assessment and Plan:    Michael Aguiar is a 37 y.o. male who presents with symptomatic prolapsed external and internal hemorrhoid disease. I discussed the details of the procedure as well as the risks of surgery including bleeding, infection, pain, anesthesia complications, possibility of recurrent disease, and the possibility of anal incontinence with any anal surgery. The patient is willing to accept the risks and would like to proceed with the surgery.              Hilton Ge MD, FACS, FASCRS  Colon and Rectal Surgery  [facility]  Office [phone]  Fax     [phone]  10/16/2017  5:12 PM     [place]

## 2017-10-16 NOTE — PROGRESS NOTES
1. Have you been to the ER, urgent care clinic since your last visit? Hospitalized since your last visit? No    2. Have you seen or consulted any other health care providers outside of the [address] [place] [address] since your last visit? Include any pap smears or colon screening. Yes Teeth pulled at dentist.      Patient presents to schedule hemorrhoidectomy. His colonoscopy was 9/15/17.

## 2017-10-16 NOTE — PATIENT INSTRUCTIONS
If you have any questions or concerns about today's appointment, the verbal and/or written instructions you were given for follow up care, please call our office at [phone]. [facility] - [address], [address] [place] [address]    [phone] office  [phone] fax      . Meliton Sammi [facility] - [address]    [phone] office main line  [phone] main fax                  PATIENT PRE AND POST [address]: 100 [facility]  [phone]    Before Surgery Instructions:   1) You must have someone available to drive you to and from your procedure and stay with you for the first 24 hours. 2) It is very important that you have nothing to eat or drink after midnight the night before your surgery. This includes chewing gum or sucking on hard candy. Take only heart, blood pressure and cholesterol medications the morning of surgery with only a sip of water. 3) Please stop taking Plavix 10 days prior to your surgery. Stop taking Coumadin 5 days prior to your surgery. Stop taking all Aspirin or Aspirin containing products 7 days prior to your surgery. Stop taking Advil, Motrin, Aleve, and etc. 3 days prior to your surgery. 4) If you take any diabetic medications please consult with your primary care physician on how to take them on the day of your surgery  5) Please stop all Herbal products 2 weeks prior to your surgery. 6) Please arrive at the hospital 1 ½ hours prior to your surgery, unless you have been otherwise instructed. 7) Patients having an operation on their colon will be given a separate instruction sheet on their Bowel Prep. 8) For any pre-operative work up check in at the main entrance to [address] [place] [address], and then go to Patient Registration.  These studies are done on a walk in basis they are open from 7:00am to 5:00pm Monday through Friday. 9) Please wash your surgical site the morning of your surgery with soap and water. 10) If you are of child bearing age you will have pregnancy test done the morning of your surgery as soon as you arrive. Surgery Date and Time:      NOvember 03,2017             at   07:30       am          Please check in at [facility], enter through the Emergency Room entrance and go up to the second floor. Please check in by    05:30     am  the day of your surgery. You may contact Randolph with any questions at [phone]. After Surgery Instructions: You will need to be seen in the office for a follow-up visit 7-14 days after your surgery. Please call after you have had the procedure to make this appointment. Unless otherwise instructed, you may remove your outer bandage and shower 48 hours after your surgery. If you develop a fever greater than 101, have any significant drainage, bleeding, swelling and/or pus of the wound. Please call our office immediately.

## 2017-10-16 NOTE — MR AVS SNAPSHOT
Visit Information Date & Time Provider Department Dept. Phone Encounter #  
 10/16/2017  3:00 PM Jah Castro MD [facility] [phone] [id number] Your Appointments 11/13/2017 11:00 AM  
POST OP with Jah Castro MD  
[address] [facility]) Appt Note: post op hemorrhoidectomy 11-03-17. ..[facility]  
 [address] [facility] [address]  
  
   
 [address] [facility] [address] [facility] [address] Upcoming Health Maintenance Date Due Pneumococcal 19-64 Medium Risk (1 of 1 - PPSV23) 12/4/1995 DTaP/Tdap/Td series (1 - Tdap) 12/4/1997 INFLUENZA AGE 9 TO ADULT 8/1/2017 COLONOSCOPY 9/15/2027 Allergies as of 10/16/2017  Review Complete On: 10/16/2017 By: Nael Mora LPN Severity Noted Reaction Type Reactions Lisinopril  08/14/2017    Other (comments) Sore throat Current Immunizations  Never Reviewed No immunizations on file. Not reviewed this visit Vitals BP Pulse Temp Resp Height(growth percentile) Weight(growth percentile) (!) 174/91 95 98 °F (36.7 °C) (Oral) 18 5' 5\" (1.651 m) 214 lb (97.1 kg) SpO2 BMI Smoking Status 96% 35.61 kg/m2 Current Every Day Smoker Vitals History BMI and BSA Data Body Mass Index Body Surface Area  
 35.61 kg/m 2 2.11 m 2 Preferred Pharmacy Pharmacy Name Phone [facility] [address], [facility] [phone] Your Updated Medication List  
  
   
This list is accurate as of: 10/16/17  3:35 PM.  Always use your most recent med list.  
  
  
  
  
 amoxicillin 500 mg Tab Take  by mouth. hydroCHLOROthiazide 25 mg tablet Commonly known as:  HYDRODIURIL Take 1 Tab by mouth daily. HYDROcodone-acetaminophen 7.5-300 mg tablet Commonly known as:  [place] Take  by mouth.  
  
 losartan 50 mg tablet Commonly known as:  COZAAR Take 1 Tab by mouth two (2) times a day. metoprolol tartrate 50 mg tablet Commonly known as:  LOPRESSOR Take 1 Tab by mouth two (2) times a day. Patient Instructions If you have any questions or concerns about today's appointment, the verbal and/or written instructions you were given for follow up care, please call our office at [phone]. [facility] - [address], [address] 
 
[phone] office [phone] fax Kassy Dewey [facility]  [address], [address] [facility] [address] 
 
[phone] office main line [phone] main fax PATIENT PRE AND POST OPERATIVE INSTRUCTIONS Hospital: [facility]. [address] 
[phone] Before Surgery Instructions:  
1) You must have someone available to drive you to and from your procedure and stay with you for the first 24 hours. 2) It is very important that you have nothing to eat or drink after midnight the night before your surgery. This includes chewing gum or sucking on hard candy. Take only heart, blood pressure and cholesterol medications the morning of surgery with only a sip of water. 3) Please stop taking Plavix 10 days prior to your surgery. Stop taking Coumadin 5 days prior to your surgery. Stop taking all Aspirin or Aspirin containing products 7 days prior to your surgery. Stop taking Advil, Motrin, Aleve, and etc. 3 days prior to your surgery. 4) If you take any diabetic medications please consult with your primary care physician on how to take them on the day of your surgery 5) Please stop all Herbal products 2 weeks prior to your surgery. 6) Please arrive at the hospital 1 ½ hours prior to your surgery, unless you have been otherwise instructed.  
7) Patients having an operation on their colon will be given a separate instruction sheet on their Bowel Prep. 8) For any pre-operative work up check in at the main entrance to [address] [place] [address], and then go to Patient Registration. These studies are done on a walk in basis they are open from 7:00am to 5:00pm Monday through Friday. 9) Please wash your surgical site the morning of your surgery with soap and water. 10) If you are of child bearing age you will have pregnancy test done the morning of your surgery as soon as you arrive. Surgery Date and Time:      NOvember 03,2017             at   07:30       am       
 
Please check in at [facility], enter through the Emergency Room entrance and go up to the second floor. Please check in by    05:30     am  the day of your surgery. You may contact Rozina Williamson with any questions at [phone]. After Surgery Instructions: You will need to be seen in the office for a follow-up visit 7-14 days after your surgery. Please call after you have had the procedure to make this appointment. Unless otherwise instructed, you may remove your outer bandage and shower 48 hours after your surgery. If you develop a fever greater than 101, have any significant drainage, bleeding, swelling and/or pus of the wound. Please call our office immediately. Introducing [facility]! Tamara Awad introduces Awdio patient portal. Now you can access parts of your medical record, email your doctor's office, and request medication refills online. 1. In your internet browser, go to [email or web address] 2. Click on the First Time User? Click Here link in the Sign In box. You will see the New Member Sign Up page. 3. Enter your Awdio Access Code exactly as it appears below. You will not need to use this code after youve completed the sign-up process. If you do not sign up before the expiration date, you must request a new code. · Awdio Access Code: [email or web address] Expires: 12/13/2017  9:38 AM 
 
4. Enter the last four digits of your Social Security Number (xxxx) and Date of Birth (mm/dd/yyyy) as indicated and click Submit. You will be taken to the next sign-up page. 5. Create a [email or web address] ID. This will be your [email or web address] login ID and cannot be changed, so think of one that is secure and easy to remember. 6. Create a [email or web address] password. You can change your password at any time. 7. Enter your Password Reset Question and Answer. This can be used at a later time if you forget your password. 8. Enter your e-mail address. You will receive e-mail notification when new information is available in [address] 9. Click Sign Up. You can now view and download portions of your medical record. 10. Click the Download Summary menu link to download a portable copy of your medical information. If you have questions, please visit the Frequently Asked Questions section of the [email or web address] website. Remember, [email or web address] is NOT to be used for urgent needs. For medical emergencies, dial 911. Now available from your iPhone and Android! Please provide this summary of care documentation to your next provider. Your primary care clinician is listed as Carolina Fink. If you have any questions after today's visit, please call [phone].

## 2017-10-17 ENCOUNTER — DOCUMENTATION ONLY (OUTPATIENT)
Dept: FAMILY MEDICINE CLINIC | Age: 42
End: 2017-10-17

## 2017-10-17 NOTE — LETTER
10/17/2017 Dennis Dai [address] [facility] [address] [phone] Dear Mr. Dennis Dai, We had an appointment reserved for you 10/13/2017 and were concerned when you did not show or call within 24 hours to cancel the appointment. Our policy is to call patients two days prior to their appointment to remind them of the date and time. We perform these calls as a courtesy to our patients and to allow us the opportunity to rebook the time slot should the appointment not be necessary. Recognizing that everyones time is valuable and that appointment time is limited, we ask that you provide 24 hours notice if you are unable to keep your appointment. Please call us at your earliest convenience to reschedule your appointment as your provider felt it was important to see you. Thank you for your anticipated cooperation. The scheduling staff: 
 
[address] [place] [address],[address] [id number] [phone]

## 2017-10-31 RX ORDER — LISINOPRIL 10 MG/1
10 TABLET ORAL
COMMUNITY
Start: 2016-12-26 | End: 2017-10-31 | Stop reason: CLARIF

## 2017-11-01 ENCOUNTER — ANESTHESIA EVENT (OUTPATIENT)
Dept: SURGERY | Age: 42
End: 2017-11-01
Payer: MEDICAID

## 2017-11-02 ENCOUNTER — ANESTHESIA (OUTPATIENT)
Dept: SURGERY | Age: 42
End: 2017-11-02
Payer: MEDICAID

## 2017-11-02 ENCOUNTER — HOSPITAL ENCOUNTER (OUTPATIENT)
Age: 42
Setting detail: OUTPATIENT SURGERY
Discharge: HOME | End: 2017-11-02
Attending: COLON & RECTAL SURGERY | Admitting: COLON & RECTAL SURGERY
Payer: MEDICAID

## 2017-11-02 VITALS
HEART RATE: 89 BPM | DIASTOLIC BLOOD PRESSURE: 69 MMHG | OXYGEN SATURATION: 96 % | RESPIRATION RATE: 16 BRPM | HEIGHT: 65 IN | BODY MASS INDEX: 35.25 KG/M2 | TEMPERATURE: 97.9 F | SYSTOLIC BLOOD PRESSURE: 130 MMHG | WEIGHT: 211.6 LBS

## 2017-11-02 LAB — POTASSIUM SERPL-SCNC: 4.8 MMOL/L (ref 3.5–5.5)

## 2017-11-02 PROCEDURE — 74011250637 HC RX REV CODE- 250/637: Performed by: ANESTHESIOLOGY

## 2017-11-02 PROCEDURE — 76210000021 HC REC RM PH II 0.5 TO 1 HR: Performed by: COLON & RECTAL SURGERY

## 2017-11-02 PROCEDURE — 74011000250 HC RX REV CODE- 250

## 2017-11-02 PROCEDURE — 76060000033 HC ANESTHESIA 1 TO 1.5 HR: Performed by: COLON & RECTAL SURGERY

## 2017-11-02 PROCEDURE — 84132 ASSAY OF SERUM POTASSIUM: CPT | Performed by: NURSE ANESTHETIST, CERTIFIED REGISTERED

## 2017-11-02 PROCEDURE — 77030013079 HC BLNKT BAIR HGGR 3M -A: Performed by: ANESTHESIOLOGY

## 2017-11-02 PROCEDURE — 74011250636 HC RX REV CODE- 250/636

## 2017-11-02 PROCEDURE — 74011000250 HC RX REV CODE- 250: Performed by: COLON & RECTAL SURGERY

## 2017-11-02 PROCEDURE — 74011250636 HC RX REV CODE- 250/636: Performed by: NURSE ANESTHETIST, CERTIFIED REGISTERED

## 2017-11-02 PROCEDURE — 74011250637 HC RX REV CODE- 250/637: Performed by: NURSE ANESTHETIST, CERTIFIED REGISTERED

## 2017-11-02 PROCEDURE — 93005 ELECTROCARDIOGRAM TRACING: CPT

## 2017-11-02 PROCEDURE — 77030002888 HC SUT CHRMC J&J -A: Performed by: COLON & RECTAL SURGERY

## 2017-11-02 PROCEDURE — 77030018823 HC SLV COMPR VENO -B: Performed by: COLON & RECTAL SURGERY

## 2017-11-02 PROCEDURE — 77030011265 HC ELECTRD BLD HEX COVD -A: Performed by: COLON & RECTAL SURGERY

## 2017-11-02 PROCEDURE — 77030011640 HC PAD GRND REM COVD -A: Performed by: COLON & RECTAL SURGERY

## 2017-11-02 PROCEDURE — 77030032490 HC SLV COMPR SCD KNE COVD -B: Performed by: COLON & RECTAL SURGERY

## 2017-11-02 PROCEDURE — 77030034115 HC SHR ENDO COAG HARM FOCS J&J -F: Performed by: COLON & RECTAL SURGERY

## 2017-11-02 PROCEDURE — 76010000149 HC OR TIME 1 TO 1.5 HR: Performed by: COLON & RECTAL SURGERY

## 2017-11-02 PROCEDURE — 88304 TISSUE EXAM BY PATHOLOGIST: CPT | Performed by: COLON & RECTAL SURGERY

## 2017-11-02 PROCEDURE — 77030018836 HC SOL IRR NACL ICUM -A: Performed by: COLON & RECTAL SURGERY

## 2017-11-02 PROCEDURE — 74011000272 HC RX REV CODE- 272: Performed by: COLON & RECTAL SURGERY

## 2017-11-02 RX ORDER — ONDANSETRON 2 MG/ML
INJECTION INTRAMUSCULAR; INTRAVENOUS AS NEEDED
Status: DISCONTINUED | OUTPATIENT
Start: 2017-11-02 | End: 2017-11-02 | Stop reason: HOSPADM

## 2017-11-02 RX ORDER — FENTANYL CITRATE 50 UG/ML
INJECTION, SOLUTION INTRAMUSCULAR; INTRAVENOUS AS NEEDED
Status: DISCONTINUED | OUTPATIENT
Start: 2017-11-02 | End: 2017-11-02 | Stop reason: HOSPADM

## 2017-11-02 RX ORDER — MIDAZOLAM HYDROCHLORIDE 1 MG/ML
INJECTION, SOLUTION INTRAMUSCULAR; INTRAVENOUS AS NEEDED
Status: DISCONTINUED | OUTPATIENT
Start: 2017-11-02 | End: 2017-11-02 | Stop reason: HOSPADM

## 2017-11-02 RX ORDER — PROPOFOL 10 MG/ML
INJECTION, EMULSION INTRAVENOUS AS NEEDED
Status: DISCONTINUED | OUTPATIENT
Start: 2017-11-02 | End: 2017-11-02 | Stop reason: HOSPADM

## 2017-11-02 RX ORDER — OXYCODONE AND ACETAMINOPHEN 5; 325 MG/1; MG/1
2 TABLET ORAL
Status: DISCONTINUED | OUTPATIENT
Start: 2017-11-02 | End: 2017-11-02 | Stop reason: HOSPADM

## 2017-11-02 RX ORDER — LIDOCAINE HYDROCHLORIDE 20 MG/ML
INJECTION, SOLUTION EPIDURAL; INFILTRATION; INTRACAUDAL; PERINEURAL AS NEEDED
Status: DISCONTINUED | OUTPATIENT
Start: 2017-11-02 | End: 2017-11-02 | Stop reason: HOSPADM

## 2017-11-02 RX ORDER — OXYCODONE AND ACETAMINOPHEN 5; 325 MG/1; MG/1
1 TABLET ORAL
Qty: 35 TAB | Refills: 0 | Status: SHIPPED | OUTPATIENT
Start: 2017-11-02 | End: 2017-11-15 | Stop reason: ALTCHOICE

## 2017-11-02 RX ORDER — LIDOCAINE HYDROCHLORIDE AND EPINEPHRINE 10; 10 MG/ML; UG/ML
30 INJECTION, SOLUTION INFILTRATION; PERINEURAL ONCE
Status: COMPLETED | OUTPATIENT
Start: 2017-11-02 | End: 2017-11-02

## 2017-11-02 RX ORDER — INSULIN LISPRO 100 [IU]/ML
INJECTION, SOLUTION INTRAVENOUS; SUBCUTANEOUS ONCE
Status: DISCONTINUED | OUTPATIENT
Start: 2017-11-02 | End: 2017-11-02 | Stop reason: HOSPADM

## 2017-11-02 RX ORDER — BUPIVACAINE HYDROCHLORIDE 2.5 MG/ML
INJECTION, SOLUTION EPIDURAL; INFILTRATION; INTRACAUDAL AS NEEDED
Status: DISCONTINUED | OUTPATIENT
Start: 2017-11-02 | End: 2017-11-02 | Stop reason: HOSPADM

## 2017-11-02 RX ORDER — FAMOTIDINE 20 MG/1
20 TABLET, FILM COATED ORAL ONCE
Status: COMPLETED | OUTPATIENT
Start: 2017-11-02 | End: 2017-11-02

## 2017-11-02 RX ORDER — PROPOFOL 10 MG/ML
INJECTION, EMULSION INTRAVENOUS
Status: DISCONTINUED | OUTPATIENT
Start: 2017-11-02 | End: 2017-11-02 | Stop reason: HOSPADM

## 2017-11-02 RX ORDER — LABETALOL HYDROCHLORIDE 5 MG/ML
INJECTION, SOLUTION INTRAVENOUS AS NEEDED
Status: DISCONTINUED | OUTPATIENT
Start: 2017-11-02 | End: 2017-11-02 | Stop reason: HOSPADM

## 2017-11-02 RX ORDER — SODIUM CHLORIDE, SODIUM LACTATE, POTASSIUM CHLORIDE, CALCIUM CHLORIDE 600; 310; 30; 20 MG/100ML; MG/100ML; MG/100ML; MG/100ML
50 INJECTION, SOLUTION INTRAVENOUS CONTINUOUS
Status: DISCONTINUED | OUTPATIENT
Start: 2017-11-02 | End: 2017-11-02 | Stop reason: HOSPADM

## 2017-11-02 RX ADMIN — FAMOTIDINE 20 MG: 20 TABLET ORAL at 10:16

## 2017-11-02 RX ADMIN — ONDANSETRON 4 MG: 2 INJECTION INTRAMUSCULAR; INTRAVENOUS at 12:08

## 2017-11-02 RX ADMIN — SODIUM CHLORIDE, SODIUM LACTATE, POTASSIUM CHLORIDE, AND CALCIUM CHLORIDE 50 ML/HR: .6; .31; .03; .02 INJECTION, SOLUTION INTRAVENOUS at 10:17

## 2017-11-02 RX ADMIN — LABETALOL HYDROCHLORIDE 10 MG: 5 INJECTION, SOLUTION INTRAVENOUS at 12:14

## 2017-11-02 RX ADMIN — FENTANYL CITRATE 50 MCG: 50 INJECTION, SOLUTION INTRAMUSCULAR; INTRAVENOUS at 11:54

## 2017-11-02 RX ADMIN — FENTANYL CITRATE 25 MCG: 50 INJECTION, SOLUTION INTRAMUSCULAR; INTRAVENOUS at 11:34

## 2017-11-02 RX ADMIN — PROPOFOL 150 MCG/KG/MIN: 10 INJECTION, EMULSION INTRAVENOUS at 11:33

## 2017-11-02 RX ADMIN — MIDAZOLAM HYDROCHLORIDE 2 MG: 1 INJECTION, SOLUTION INTRAMUSCULAR; INTRAVENOUS at 11:28

## 2017-11-02 RX ADMIN — LABETALOL HYDROCHLORIDE 10 MG: 5 INJECTION, SOLUTION INTRAVENOUS at 11:52

## 2017-11-02 RX ADMIN — OXYCODONE HYDROCHLORIDE AND ACETAMINOPHEN 2 TABLET: 5; 325 TABLET ORAL at 13:06

## 2017-11-02 RX ADMIN — LIDOCAINE HYDROCHLORIDE 20 MG: 20 INJECTION, SOLUTION EPIDURAL; INFILTRATION; INTRACAUDAL; PERINEURAL at 11:32

## 2017-11-02 RX ADMIN — PROPOFOL 20 MG: 10 INJECTION, EMULSION INTRAVENOUS at 11:42

## 2017-11-02 RX ADMIN — FENTANYL CITRATE 25 MCG: 50 INJECTION, SOLUTION INTRAMUSCULAR; INTRAVENOUS at 11:37

## 2017-11-02 RX ADMIN — PROPOFOL 30 MG: 10 INJECTION, EMULSION INTRAVENOUS at 11:35

## 2017-11-02 RX ADMIN — LABETALOL HYDROCHLORIDE 10 MG: 5 INJECTION, SOLUTION INTRAVENOUS at 12:00

## 2017-11-02 NOTE — PERIOP NOTES
Pt assisted x 2 to restroom and returned to recliner. Given Crackers and Ginger ale. Speaking to Dr. Sergio Ovalle re:  Pain medication. 1316  Pt PO medicated for pain. Reiterated safety precautions. Pt gave verbal understanding and demonstration. Pt's todd's and designated arnulfo Diallo) ETA 10 mins. Pt updated.

## 2017-11-02 NOTE — ANESTHESIA PREPROCEDURE EVALUATION
Anesthetic History   No history of anesthetic complications            Review of Systems / Medical History  Patient summary reviewed and pertinent labs reviewed    Pulmonary          Smoker         Neuro/Psych   Within defined limits           Cardiovascular  Within defined limits  Hypertension: well controlled                   GI/Hepatic/Renal  Within defined limits   GERD: well controlled           Endo/Other        Obesity     Other Findings   Comments:   Risk Factors for Postoperative nausea/vomiting:       History of postoperative nausea/vomiting? NO       Female? NO       Motion sickness? NO       Intended opioid administration for postoperative analgesia? YES      Smoking Abstinence  Current Smoker? YES  Elective Surgery? YES  Seen preoperatively by anesthesiologist or proxy prior to day of surgery? YES  Pt abstained from smoking 24 hours prior to anesthesia?  NO           Physical Exam    Airway  Mallampati: II  TM Distance: 4 - 6 cm  Neck ROM: normal range of motion   Mouth opening: Normal     Cardiovascular               Dental  No notable dental hx       Pulmonary                 Abdominal  GI exam deferred       Other Findings            Anesthetic Plan    ASA: 3  Anesthesia type: MAC            Anesthetic plan and risks discussed with: Patient

## 2017-11-02 NOTE — ANESTHESIA POSTPROCEDURE EVALUATION
Post-Anesthesia Evaluation and Assessment    Patient: Amber Rodriguez MRN: [id number]  SSN: [id number]    YOB: 1975  Age: 37 y.o. Sex: male      Data from PACU flowsheet    Cardiovascular Function/Vital Signs  Visit Vitals    /69 (BP 1 Location: Right arm, BP Patient Position: At rest)    Pulse 89    Temp 36.6 °C (97.9 °F)    Resp 16    Ht 5' 5\" (1.651 m)    Wt 96 kg (211 lb 9.6 oz)    SpO2 96%    BMI 35.21 kg/m2       Patient is status post MAC anesthesia for Procedure(s): HEMORRHOIDECTOMY . Nausea/Vomiting: controlled    Postoperative hydration reviewed and adequate. Pain:  Pain Scale 1: Numeric (0 - 10) (11/02/17 0947)  Pain Intensity 1: 6 (11/02/17 0947)   Managed      Mental Status and Level of Consciousness: Alert and oriented     Pulmonary Status:   O2 Device: Room air (11/02/17 1241)   Adequate oxygenation and airway patent    Complications related to anesthesia: None    Post-anesthesia assessment completed.  No concerns    Signed By: Uche Whatley MD     November 2, 2017

## 2017-11-02 NOTE — OP NOTES
COLON AND RECTAL SURGERY OPERATIVE NOTE            Procedure Date: 11/2/2017    Indications: Symptomatic hemorrhoid disease    Pre-operative Diagnosis:  Symptomatic hemorrhoid disease    Post-operative Diagnosis:  Symptomatic hemorrhoid disease    Title of Procedure:  Complex 3 Quadrant External and Internal Hemorrhoidectomy     Surgeon(s): Selvin Kelly MD    Assistants: Luis Martínez: Fred Woodruff  Circ-Relief: Collin Linda RN  Scrub Tech-1: Jane Delcid  Scrub Tech-Relief: Jorge L Gill  Surg Asst-1: Katelynn Lopez    Anesthesiologist: Anesthesiologist: Maame Serna MD  CRNA: Roseline Davila CRNA; Brenda Parsons CRNA  SRNA: Brett Dixon    Anesthesia: MAC    ASA Class: ASA 2 - Mild systemic disease      Indication for surgery:   The patient is a 37 y.o., [address] male who was recently seen in clinic and was noted to have symptomatic hemorrhoid disease. Due to the severity of the disease process, surgical hemorrhoidectomy was discussed for more definitive management. The patient was informed of the indication for the procedure as well as the risk and complications. I discussed the details of the procedure as well as the risks of surgery including bleeding, infection, pain, anesthesia complications, possibility of recurrent disease, and the possibility of anal incontinence with any anal surgery. The patient demonstrated good understanding of surgical considerations, risks, benefits and alternatives and was willing to accept the risks of surgery. The patient elected to proceed with surgery, and therefore the surgery was scheduled.         Procedure    Findings:  Right anterior quadrant - Severe prolapsed external and internal hemorrhoid disease                    Right posterior quadrant - Severe prolapsed external and internal hemorrhoid disease                    Left lateral quadrant - Moderate prolapsed external and internal hemorrhoid disease    Procedure Details: The patient was brought to the operating room and placed on the operating room table in the prone position after MAC anesthesia was successfully achieved by the Anesthesiology gayla. The safety strap was carefully secured and pressure points were carefully padded. The patient was then prepped and draped in the standard sterile fashion. The pneumatic compression boots in the lower extremity were placed prior to surgery. I next injected 20 mL of 1% lidocaine with epinephrine at the operative sites for satisfactory local anesthesia. With the aid of the Monae retractor, the anal canal was thoroughly evaluated. The patient had the symptomatic hemorrhoid disease as listed in the Findings section above. No other abnormalities were noted. I proceeded with the surgical hemorrhoidectomy in the following fashion for the right posterior quadrant hemorrhoid disease. An inverted \"V\" incision was made sharply encompassing the diseased external hemorrhoidal tissues. This was excised sharply and with the aid of the electrocautery for hemostasis from the subcutaneous external sphincter muscle layer. In continuity, all of the internal hemorrhoidal tissues were excised from the internal sphincter muscle layer with the use of the Harmonic Focus. Care was taken to avoid any injury to the sphincter muscle layers. The mucosal defect was then closed with a 3-0 Chromic suture in the running locked fashion. The anoderm and the perianal skin defects were closed with the same suture, but in a simple running fashion. Next the hemorrhoid disease in the right anterior quadrant and left lateral quadrant were surgically excised in similar fashion. The anal canal was then irrigated with sterile saline, and hemostasis was noted to be complete. I next injected approximately 10 mL of 0.25% marcaine at the operative sites for post operative analgesia. A sterile dry dressing was next placed at the anal verge.      The patient tolerated the procedure well and sent to the recovery room in good condition. Needle and sponge counts were correct.        Estimated Blood Loss:  50 mL           Drains: None           Total IV Fluids: 500 mL           Specimens: Sent - Right posterior and anterior, Left lateral Hemorrhoids to Pathology                  Complications: None             Disposition: aroused from sedation, and taken to the recovery room in a stable condition           Condition: good    Surgeons Signature:       Edgar Mojica MD, FACS, FASCRS  Colon and Rectal Surgery  [facility]  Office [phone]  Fax     [phone]  11/2/2017  12:40 PM

## 2017-11-02 NOTE — IP AVS SNAPSHOT
[address] [address] Zoë Hwang  
[phone] Patient: Anna Borges MRN: [id number] :1975 About your hospitalization You were admitted on:  2017 You last received care in the:  [facility] PHASE 2 RECOVERY You were discharged on:  2017 Why you were hospitalized Your primary diagnosis was:  Not on File Things You Need To Do (next 8 weeks) Follow up with [address] Jessy Melgar MD  
  
Phone:  [phone] Where:  [address] [facility], [place] [address] Schedule an appointment with Roseanna Acuna MD as soon as possible for a visit in 2 week(s) Phone:  [phone] Where:  [address] [facility], [address] [facility] [address]  POST OP with Roseanna Acuna MD at 11:00 AM  
Where: [address] ([facility]) Discharge Orders None A check katey indicates which time of day the medication should be taken. My Medications TAKE these medications as instructed Instructions Each Dose to Equal  
 Morning Noon Evening Bedtime  
 amoxicillin 500 mg Tab Your last dose was: Your next dose is: Take  by mouth. hydroCHLOROthiazide 25 mg tablet Commonly known as:  HYDRODIURIL Your last dose was: Your next dose is: Take 1 Tab by mouth daily. 25 mg  
    
   
   
   
  
 losartan 50 mg tablet Commonly known as:  COZAAR Your last dose was: Your next dose is: Take 1 Tab by mouth two (2) times a day. 50 mg  
    
   
   
   
  
 metoprolol tartrate 50 mg tablet Commonly known as:  LOPRESSOR Your last dose was: Your next dose is: Take 1 Tab by mouth two (2) times a day. 50 mg  
    
   
   
   
  
 oxyCODONE-acetaminophen 5-325 mg per tablet Commonly known as:  PERCOCET Your last dose was: Your next dose is: Take 1 Tab by mouth every four (4) hours as needed for Pain. Max Daily Amount: 6 Tabs. 1 Tab Where to Get Your Medications Information on where to get these meds will be given to you by the nurse or doctor. ! Ask your nurse or doctor about these medications  
  oxyCODONE-acetaminophen 5-325 mg per tablet Discharge Instructions [facility], [address] [place], [address] [place] [address] Anal and Rectal Surgery Discharge Instructions These instructions will cover the most common concerns following surgery on the anal area (lateral internal and sphincterotomy, incision and drainage of abscess, mucosal flap repair of fistula, hemorrhoidectomy, skin tag excision, pilonidal cyst). If you have further questions or problems, please contact our office. The office is open Monday - Friday 8:30 AM to 4:30 pm. If emergencies arise after business hours, the answering service can contact the on-call physician. The number for the office and answering service is [phone]. Recovery from Anesthesia/Sedation · Do not engage in any activity that requires physical/mental coordination, as the medications may cause drowsiness and dizziness. · Do not drive or operate heavy machinery for 24 hours. · Do not consume alcohol, tranquilizers or sleeping medications for 24 hours. · You must have someone home with you today. Activity · You are advised to go directly home. Restrict your activities and rest for a day. · Resume light to normal activity tomorrow unless instructed differently. · Try to avoid sitting for prolonged periods with pressure on the surgical site. Reclining or sitting to one side is better. It may take several weeks to return to normal activity. Fluids and Diet · Begin with a light diet (soup, toast, crackers). · Unless instructed differently, you may advance to regular food. · Avoid heavy, greasy and spicy foods. · Drink plenty of fluids daily. Follow-Up Unless you already have an appointment, call the office ([phone]) when you get home to make an appointment to see your surgeon approximately 2 weeks after discharge from the hospital. Also call the office for: · Fever greater than 101.5 F 
· Inability to urinate for more than 8 hours · Warmth, redness, or foul-smelling drainage from around the incision · Sudden loss of bright red blood from surgical site or rectum (greater than 1/2 cup). Pain Discomfort is common after surgery in this area. Soaking in a Sitz bath or tub of warn water may help with pain. · A narcotic pain medication has been prescribed by your surgeon. Take as directed. · Use your over-the-counter pain medication such as Ibuprofen when you have finished the prescription provided by your surgeon or if you feel that the pain can be managed with these medications. Bowel Regimen Many people find it helps to take fiber supplementation and a stool softener to regulate the bowels after surgery, especially if narcotic pain medication is being used. Colace or Miralax are options. You can take Milk of Magnesia one to two tablespoons every 4-6 hours as needed for more severe constipation. Wound Care It is common to have some clear or light yellow or pink-tinged drainage from the incision. Thick, foul-smelling drainage can be a sign of infection. Call the office if this occurs. It is also common to have small amounts of bleeding with bowel movements. Keep the surgical area clean by washing in the shower, tub or sitz bath after bowel movements. Gauze pads or a menstrual pad cab be used to protect clothing from drainage. It is fine to wash the wound in the shower. Avoid putting soap directly into the incision. · Remove packing and/or dressing  from incision in 24 hours. · It is important to take tub or sitz baths at least 4 times daily soaking at least 15 minutes each time. Try to take these baths especially after bowel movements. · Keep wound or incision covered with antibiotic ointment and dry gauze after each baths. Please contact our office for any concerns or questions. Jacquelyn Ayala MD, FACS, FASCRS Colon and Rectal Surgery [facility] Office [phone] Fax     [phone] These instructions have been reviewed with me. I understand the above instructions and have no further questions. Responsible Party Signature: ______________________________________ Date: November 2, 2017 Nurse's Signature: ______________________________________ Date: November 2, 2017 DISCHARGE SUMMARY from Nurse PATIENT INSTRUCTIONS: 
 
After general anesthesia or intravenous sedation, for 24 hours or while taking prescription Narcotics: · Limit your activities · Do not drive and operate hazardous machinery · Do not make important personal or business decisions · Do  not drink alcoholic beverages · If you have not urinated within 8 hours after discharge, please contact your surgeon on call. Report the following to your surgeon: 
· Excessive pain, swelling, redness or odor of or around the surgical area · Temperature over 100.5 · Nausea and vomiting lasting longer than 4 hours or if unable to take medications · Any signs of decreased circulation or nerve impairment to extremity: change in color, persistent  numbness, tingling, coldness or increase pain · Any questions What to do at Home: These are general instructions for a healthy lifestyle: No smoking/ No tobacco products/ Avoid exposure to second hand smoke Surgeon General's Warning:  Quitting smoking now greatly reduces serious risk to your health. Obesity, smoking, and sedentary lifestyle greatly increases your risk for illness A healthy diet, regular physical exercise & weight monitoring are important for maintaining a healthy lifestyle You may be retaining fluid if you have a history of heart failure or if you experience any of the following symptoms:  Weight gain of 3 pounds or more overnight or 5 pounds in a week, increased swelling in our hands or feet or shortness of breath while lying flat in bed. Please call your doctor as soon as you notice any of these symptoms; do not wait until your next office visit. Recognize signs and symptoms of STROKE: 
 
F-face looks uneven A-arms unable to move or move unevenly S-speech slurred or non-existent T-time-call 911 as soon as signs and symptoms begin-DO NOT go Back to bed or wait to see if you get better-TIME IS BRAIN. Warning Signs of HEART ATTACK Call 911 if you have these symptoms: 
? Chest discomfort. Most heart attacks involve discomfort in the center of the chest that lasts more than a few minutes, or that goes away and comes back. It can feel like uncomfortable pressure, squeezing, fullness, or pain. ? Discomfort in other areas of the upper body. Symptoms can include pain or discomfort in one or both arms, the back, neck, jaw, or stomach. ? Shortness of breath with or without chest discomfort. ? Other signs may include breaking out in a cold sweat, nausea, or lightheadedness. Don't wait more than five minutes to call [address]! Fast action can save your life. Calling 911 is almost always the fastest way to get lifesaving treatment. Emergency Medical Services staff can begin treatment when they arrive  up to an hour sooner than if someone gets to the hospital by car. The discharge information has been reviewed with the patient. The patient verbalized understanding. Discharge medications reviewed with the patient and appropriate educational materials and side effects teaching were provided. Patient armband removed and given to patient to take home. Patient was informed of the privacy risks if armband lost or stolen DISCHARGE SUMMARY from Nurse PATIENT INSTRUCTIONS: 
 
After general anesthesia or intravenous sedation, for 24 hours or while taking prescription Narcotics: · Limit your activities · Do not drive and operate hazardous machinery · Do not make important personal or business decisions · Do  not drink alcoholic beverages · If you have not urinated within 8 hours after discharge, please contact your surgeon on call. Report the following to your surgeon: 
· Excessive pain, swelling, redness or odor of or around the surgical area · Temperature over 100.5 · Nausea and vomiting lasting longer than 4 hours or if unable to take medications · Any signs of decreased circulation or nerve impairment to extremity: change in color, persistent  numbness, tingling, coldness or increase pain · Any questions What to do at Home: These are general instructions for a healthy lifestyle: No smoking/ No tobacco products/ Avoid exposure to second hand smoke Surgeon General's Warning:  Quitting smoking now greatly reduces serious risk to your health. Obesity, smoking, and sedentary lifestyle greatly increases your risk for illness A healthy diet, regular physical exercise & weight monitoring are important for maintaining a healthy lifestyle You may be retaining fluid if you have a history of heart failure or if you experience any of the following symptoms:  Weight gain of 3 pounds or more overnight or 5 pounds in a week, increased swelling in our hands or feet or shortness of breath while lying flat in bed. Please call your doctor as soon as you notice any of these symptoms; do not wait until your next office visit. Recognize signs and symptoms of STROKE: 
 
F-face looks uneven A-arms unable to move or move unevenly S-speech slurred or non-existent T-time-call 911 as soon as signs and symptoms begin-DO NOT go Back to bed or wait to see if you get better-TIME IS BRAIN. Warning Signs of HEART ATTACK Call 911 if you have these symptoms: 
? Chest discomfort. Most heart attacks involve discomfort in the center of the chest that lasts more than a few minutes, or that goes away and comes back. It can feel like uncomfortable pressure, squeezing, fullness, or pain. ? Discomfort in other areas of the upper body. Symptoms can include pain or discomfort in one or both arms, the back, neck, jaw, or stomach. ? Shortness of breath with or without chest discomfort. ? Other signs may include breaking out in a cold sweat, nausea, or lightheadedness. Don't wait more than five minutes to call [address]! Fast action can save your life. Calling 911 is almost always the fastest way to get lifesaving treatment. Emergency Medical Services staff can begin treatment when they arrive  up to an hour sooner than if someone gets to the hospital by car. The discharge information has been reviewed with the patient. The patient verbalized understanding. Discharge medications reviewed with the patient and appropriate educational materials and side effects teaching were provided. Patient armband removed and given to patient to take home. Patient was informed of the privacy risks if armband lost or stolen Introducing [facility]! Alisson De Paz introduces [employer] patient portal. Now you can access parts of your medical record, email your doctor's office, and request medication refills online. 1. In your internet browser, go to [email or web address] 2. Click on the First Time User? Click Here link in the Sign In box. You will see the New Member Sign Up page. 3. Enter your [employer] Access Code exactly as it appears below. You will not need to use this code after youve completed the sign-up process.  If you do not sign up before the expiration date, you must request a new code. · [employer] Access Code: [email or web address] Expires: 12/13/2017  9:38 AM 
 
4. Enter the last four digits of your Social Security Number (xxxx) and Date of Birth (mm/dd/yyyy) as indicated and click Submit. You will be taken to the next sign-up page. 5. Create a [employer] ID. This will be your [employer] login ID and cannot be changed, so think of one that is secure and easy to remember. 6. Create a [employer] password. You can change your password at any time. 7. Enter your Password Reset Question and Answer. This can be used at a later time if you forget your password. 8. Enter your e-mail address. You will receive e-mail notification when new information is available in [address] 9. Click Sign Up. You can now view and download portions of your medical record. 10. Click the Download Summary menu link to download a portable copy of your medical information. If you have questions, please visit the Frequently Asked Questions section of the [employer] website. Remember, [employer] is NOT to be used for urgent needs. For medical emergencies, dial 911. Now available from your iPhone and Android! Unresulted Labs-Please follow up with your PCP about these lab tests Order Current Status EKG, 12 LEAD, INITIAL Preliminary result Providers Seen During Your Hospitalization Provider Specialty Primary office phone Edwin Elias MD Colon and Rectal Surgery [phone] Your Primary Care Physician (PCP) Primary Care Physician Office Phone Office Fax [facility] [phone] You are allergic to the following Allergen Reactions Lisinopril Other (comments) Sore throat Recent Documentation Height Weight BMI Smoking Status 1.651 m 96 kg 35.21 kg/m2 Current Every Day Smoker Emergency Contacts Name Discharge Info Relation Home Work Mobile Sweta Marques DISCHARGE CAREGIVER [3] Girlfriend [18]   [phone] Patient Belongings The following personal items are in your possession at time of discharge: 
  Dental Appliances: None  Visual Aid: None      Home Medications: None   Jewelry: None  Clothing: Pants, Shirt, Footwear, Undergarments, Socks    Other Valuables: Cell Phone Please provide this summary of care documentation to your next provider. Signatures-by signing, you are acknowledging that this After Visit Summary has been reviewed with you and you have received a copy. Patient Signature:  ____________________________________________________________ Date:  ____________________________________________________________  
  
[facility] Provider Signature:  ____________________________________________________________ Date:  ____________________________________________________________

## 2017-11-02 NOTE — DISCHARGE INSTRUCTIONS
[facility]  [address] [facility], [address] [place] [address], [address] [place] [address]            Anal and Rectal Surgery Discharge Instructions    These instructions will cover the most common concerns following surgery on the anal area (lateral internal and sphincterotomy, incision and drainage of abscess, mucosal flap repair of fistula, hemorrhoidectomy, skin tag excision, pilonidal cyst). If you have further questions or problems, please contact our office. The office is open Monday - Friday 8:30 AM to 4:30 pm. If emergencies arise after business hours, the answering service can contact the on-call physician. The number for the office and answering service is [phone]. Recovery from Anesthesia/Sedation  · Do not engage in any activity that requires physical/mental coordination, as the medications may cause drowsiness and dizziness. · Do not drive or operate heavy machinery for 24 hours. · Do not consume alcohol, tranquilizers or sleeping medications for 24 hours. · You must have someone home with you today. Activity  · You are advised to go directly home. Restrict your activities and rest for a day. · Resume light to normal activity tomorrow unless instructed differently. · Try to avoid sitting for prolonged periods with pressure on the surgical site. Reclining or sitting to one side is better. It may take several weeks to return to normal activity. Fluids and Diet  · Begin with a light diet (soup, toast, crackers). · Unless instructed differently, you may advance to regular food. · Avoid heavy, greasy and spicy foods. · Drink plenty of fluids daily.     Follow-Up  Unless you already have an appointment, call the office ([phone]) when you get home to make an appointment to see your surgeon approximately 2 weeks after discharge from the hospital. Also call the office for:  · Fever greater than 101.5 F  · Inability to urinate for more than 8 hours  · Warmth, redness, or foul-smelling drainage from around the incision  · Sudden loss of bright red blood from surgical site or rectum (greater than 1/2 cup). Pain  Discomfort is common after surgery in this area. Soaking in a Sitz bath or tub of warn water may help with pain. · A narcotic pain medication has been prescribed by your surgeon. Take as directed. · Use your over-the-counter pain medication such as Ibuprofen when you have finished the prescription provided by your surgeon or if you feel that the pain can be managed with these medications. Bowel Regimen  Many people find it helps to take fiber supplementation and a stool softener to regulate the bowels after surgery, especially if narcotic pain medication is being used. Colace or Miralax are options. You can take Milk of Magnesia one to two tablespoons every 4-6 hours as needed for more severe constipation. Wound Care  It is common to have some clear or light yellow or pink-tinged drainage from the incision. Thick, foul-smelling drainage can be a sign of infection. Call the office if this occurs. It is also common to have small amounts of bleeding with bowel movements. Keep the surgical area clean by washing in the shower, tub or sitz bath after bowel movements. Gauze pads or a menstrual pad cab be used to protect clothing from drainage. It is fine to wash the wound in the shower. Avoid putting soap directly into the incision. · Remove packing and/or dressing  from incision in 24 hours. · It is important to take tub or sitz baths at least 4 times daily soaking at least 15 minutes each time. Try to take these baths especially after bowel movements. · Keep wound or incision covered with antibiotic ointment and dry gauze after each baths. Please contact our office for any concerns or questions.     Edgar Mojica MD, FACS, FASCRS  Colon and Rectal Surgery  [facility]  Office [phone]  Fax     [phone]      These instructions have been reviewed with me. I understand the above instructions and have no further questions. Responsible Party Signature: ______________________________________    Date: November 2, 2017    Nurse's Signature: ______________________________________    Date: November 2, 2017            DISCHARGE SUMMARY from Nurse    PATIENT INSTRUCTIONS:    After general anesthesia or intravenous sedation, for 24 hours or while taking prescription Narcotics:  · Limit your activities  · Do not drive and operate hazardous machinery  · Do not make important personal or business decisions  · Do  not drink alcoholic beverages  · If you have not urinated within 8 hours after discharge, please contact your surgeon on call. Report the following to your surgeon:  · Excessive pain, swelling, redness or odor of or around the surgical area  · Temperature over 100.5  · Nausea and vomiting lasting longer than 4 hours or if unable to take medications  · Any signs of decreased circulation or nerve impairment to extremity: change in color, persistent  numbness, tingling, coldness or increase pain  · Any questions    What to do at Home:    These are general instructions for a healthy lifestyle:    No smoking/ No tobacco products/ Avoid exposure to second hand smoke  Surgeon General's Warning:  Quitting smoking now greatly reduces serious risk to your health. Obesity, smoking, and sedentary lifestyle greatly increases your risk for illness    A healthy diet, regular physical exercise & weight monitoring are important for maintaining a healthy lifestyle    You may be retaining fluid if you have a history of heart failure or if you experience any of the following symptoms:  Weight gain of 3 pounds or more overnight or 5 pounds in a week, increased swelling in our hands or feet or shortness of breath while lying flat in bed. Please call your doctor as soon as you notice any of these symptoms; do not wait until your next office visit.     Recognize signs and symptoms of STROKE:    F-face looks uneven    A-arms unable to move or move unevenly    S-speech slurred or non-existent    T-time-call 911 as soon as signs and symptoms begin-DO NOT go       Back to bed or wait to see if you get better-TIME IS BRAIN. Warning Signs of HEART ATTACK     Call 911 if you have these symptoms:   Chest discomfort. Most heart attacks involve discomfort in the center of the chest that lasts more than a few minutes, or that goes away and comes back. It can feel like uncomfortable pressure, squeezing, fullness, or pain.  Discomfort in other areas of the upper body. Symptoms can include pain or discomfort in one or both arms, the back, neck, jaw, or stomach.  Shortness of breath with or without chest discomfort.  Other signs may include breaking out in a cold sweat, nausea, or lightheadedness. Don't wait more than five minutes to call 911 - MINUTES MATTER! Fast action can save your life. Calling 911 is almost always the fastest way to get lifesaving treatment. Emergency Medical Services staff can begin treatment when they arrive -- up to an hour sooner than if someone gets to the hospital by car. The discharge information has been reviewed with the patient. The patient verbalized understanding. Discharge medications reviewed with the patient and appropriate educational materials and side effects teaching were provided. Patient armband removed and given to patient to take home.   Patient was informed of the privacy risks if armband lost or stolen      DISCHARGE SUMMARY from Nurse    PATIENT INSTRUCTIONS:    After general anesthesia or intravenous sedation, for 24 hours or while taking prescription Narcotics:  · Limit your activities  · Do not drive and operate hazardous machinery  · Do not make important personal or business decisions  · Do  not drink alcoholic beverages  · If you have not urinated within 8 hours after discharge, please contact your surgeon on call.    Report the following to your surgeon:  · Excessive pain, swelling, redness or odor of or around the surgical area  · Temperature over 100.5  · Nausea and vomiting lasting longer than 4 hours or if unable to take medications  · Any signs of decreased circulation or nerve impairment to extremity: change in color, persistent  numbness, tingling, coldness or increase pain  · Any questions    What to do at Home:    These are general instructions for a healthy lifestyle:    No smoking/ No tobacco products/ Avoid exposure to second hand smoke  Surgeon General's Warning:  Quitting smoking now greatly reduces serious risk to your health. Obesity, smoking, and sedentary lifestyle greatly increases your risk for illness    A healthy diet, regular physical exercise & weight monitoring are important for maintaining a healthy lifestyle    You may be retaining fluid if you have a history of heart failure or if you experience any of the following symptoms:  Weight gain of 3 pounds or more overnight or 5 pounds in a week, increased swelling in our hands or feet or shortness of breath while lying flat in bed. Please call your doctor as soon as you notice any of these symptoms; do not wait until your next office visit. Recognize signs and symptoms of STROKE:    F-face looks uneven    A-arms unable to move or move unevenly    S-speech slurred or non-existent    T-time-call 911 as soon as signs and symptoms begin-DO NOT go       Back to bed or wait to see if you get better-TIME IS BRAIN. Warning Signs of HEART ATTACK     Call 911 if you have these symptoms:   Chest discomfort. Most heart attacks involve discomfort in the center of the chest that lasts more than a few minutes, or that goes away and comes back. It can feel like uncomfortable pressure, squeezing, fullness, or pain.  Discomfort in other areas of the upper body.  Symptoms can include pain or discomfort in one or both arms, the back, neck, jaw, or stomach.  Shortness of breath with or without chest discomfort.  Other signs may include breaking out in a cold sweat, nausea, or lightheadedness. Don't wait more than five minutes to call 911 - MINUTES MATTER! Fast action can save your life. Calling 911 is almost always the fastest way to get lifesaving treatment. Emergency Medical Services staff can begin treatment when they arrive -- up to an hour sooner than if someone gets to the hospital by car. The discharge information has been reviewed with the patient. The patient verbalized understanding. Discharge medications reviewed with the patient and appropriate educational materials and side effects teaching were provided. Patient armband removed and given to patient to take home.   Patient was informed of the privacy risks if armband lost or stolen

## 2017-11-02 NOTE — H&P (VIEW-ONLY)
[facility]  [address] [facility], [address] [place], [place] [address], [facility]            Colon and Rectal Surgery    Patient: Michael Aguiar  MRN: [id number]  SSN: [id number]   YOB: 1975  Age: 37 y.o. Sex: male       Michael Aguiar is a 37 y.o. male who presented for colonoscopy exam for repeated episodes of bright red rectal bleeding often mixed with stool. This has been on and off for the past 15 years. He underwent a colonoscopy exam on 9/15/2017 which showed symptomatic prolapsed external and internal hemorrhoid disease in the right anterior and posterior quadrants as well mild diverticulosis of the left colon. The patient wants to proceed with the surgical hemorrhoidectomy. Past Medical History:   Diagnosis Date    Hypertension     Kidney stone      Past Surgical History:   Procedure Laterality Date    COLONOSCOPY N/A 9/15/2017    COLONOSCOPY performed by Roscoe Green MD at [address] [place]      left ankle, hardware in place     Allergies   Allergen Reactions    Lisinopril Other (comments)     Sore throat     Current Outpatient Prescriptions   Medication Sig Dispense Refill    HYDROcodone-acetaminophen (XODOL) 7.5-300 mg tablet Take  by mouth.  amoxicillin 500 mg tab Take  by mouth.  metoprolol tartrate (LOPRESSOR) 50 mg tablet Take 1 Tab by mouth two (2) times a day. 60 Tab 3    hydroCHLOROthiazide (HYDRODIURIL) 25 mg tablet Take 1 Tab by mouth daily. 30 Tab 3    losartan (COZAAR) 50 mg tablet Take 1 Tab by mouth two (2) times a day.  60 Tab 3         Physical Examination    Vitals:    10/16/17 1438   BP: (!) 174/91   Pulse: 95   Resp: 18   Temp: 98 °F (36.7 °C)   TempSrc: Oral   SpO2: 96%   Weight: 97.1 kg (214 lb)   Height: 5' 5\" (1.651 m)        Physical Exam:   GENERAL: alert, cooperative, no distress, appears stated age  LUNG: clear to auscultation bilaterally  HEART: regular rate and rhythm, S1, S2 normal, no murmur, click, rub or gallop  ABDOMEN: soft, non-tender. Bowel sounds normal. No masses,  no organomegaly  EXTREMITIES:  extremities normal, atraumatic, no cyanosis or edema      Assessment and Plan:    Gaston Jang is a 37 y.o. male who presents with symptomatic prolapsed external and internal hemorrhoid disease. I discussed the details of the procedure as well as the risks of surgery including bleeding, infection, pain, anesthesia complications, possibility of recurrent disease, and the possibility of anal incontinence with any anal surgery. The patient is willing to accept the risks and would like to proceed with the surgery.              Macho Son MD, FACS, FASCRS  Colon and Rectal Surgery  [facility]  Office [phone]  Fax     [phone]  10/16/2017  5:12 PM     [place]

## 2017-11-03 LAB
ATRIAL RATE: 78 BPM
CALCULATED P AXIS, ECG09: 36 DEGREES
CALCULATED R AXIS, ECG10: 42 DEGREES
CALCULATED T AXIS, ECG11: 13 DEGREES
DIAGNOSIS, 93000: NORMAL
P-R INTERVAL, ECG05: 160 MS
Q-T INTERVAL, ECG07: 428 MS
QRS DURATION, ECG06: 108 MS
QTC CALCULATION (BEZET), ECG08: 487 MS
VENTRICULAR RATE, ECG03: 78 BPM

## 2017-11-14 ENCOUNTER — OFFICE VISIT (OUTPATIENT)
Dept: SURGERY | Age: 42
End: 2017-11-14

## 2017-11-14 VITALS
OXYGEN SATURATION: 100 % | HEART RATE: 85 BPM | RESPIRATION RATE: 18 BRPM | TEMPERATURE: 96.5 F | DIASTOLIC BLOOD PRESSURE: 89 MMHG | BODY MASS INDEX: 35.49 KG/M2 | WEIGHT: 213 LBS | SYSTOLIC BLOOD PRESSURE: 160 MMHG | HEIGHT: 65 IN

## 2017-11-14 DIAGNOSIS — K64.8 INTERNAL AND EXTERNAL PROLAPSED HEMORRHOIDS: Primary | ICD-10-CM

## 2017-11-14 NOTE — PROGRESS NOTES
1. Have you been to the ER, urgent care clinic since your last visit? Hospitalized since your last visit? No    2. Have you seen or consulted any other health care providers outside of the [address] [place] [address] since your last visit? Include any pap smears or colon screening. No     Patient presents for post op from hemorrhoidectomy on 11/2/17.

## 2017-11-14 NOTE — MR AVS SNAPSHOT
Visit Information Date & Time Provider Department Dept. Phone Encounter #  
 11/14/2017 10:30 AM MD [facility] [phone] [id number] Upcoming Health Maintenance Date Due Pneumococcal 19-64 Medium Risk (1 of 1 - PPSV23) 12/4/1995 DTaP/Tdap/Td series (1 - Tdap) 12/4/1997 Influenza Age 5 to Adult 8/1/2017 COLONOSCOPY 9/15/2027 Allergies as of 11/14/2017  Review Complete On: 11/14/2017 By: Herson Helms LPN Severity Noted Reaction Type Reactions Lisinopril  08/14/2017    Other (comments) Sore throat Current Immunizations  Never Reviewed No immunizations on file. Not reviewed this visit Vitals BP Pulse Temp Resp Height(growth percentile) Weight(growth percentile) 160/89 85 96.5 °F (35.8 °C) (Oral) 18 5' 5\" (1.651 m) 213 lb (96.6 kg) SpO2 BMI Smoking Status 100% 35.45 kg/m2 Current Every Day Smoker BMI and BSA Data Body Mass Index Body Surface Area  
 35.45 kg/m 2 2.1 m 2 Preferred Pharmacy Pharmacy Name Phone [facility] [address], [address] [place] [address] [phone] Your Updated Medication List  
  
   
This list is accurate as of: 11/14/17 10:59 AM.  Always use your most recent med list.  
  
  
  
  
 amoxicillin 500 mg Tab Take  by mouth. hydroCHLOROthiazide 25 mg tablet Commonly known as:  HYDRODIURIL Take 1 Tab by mouth daily. losartan 50 mg tablet Commonly known as:  COZAAR Take 1 Tab by mouth two (2) times a day. metoprolol tartrate 50 mg tablet Commonly known as:  LOPRESSOR Take 1 Tab by mouth two (2) times a day. oxyCODONE-acetaminophen 5-325 mg per tablet Commonly known as:  PERCOCET Take 1 Tab by mouth every four (4) hours as needed for Pain. Max Daily Amount: 6 Tabs. Patient Instructions If you have any questions or concerns about today's appointment, the verbal and/or written instructions you were given for follow up care, please call our office at [phone]. [facility] - [address], [address] 
 
[phone] office [phone] Introducing [facility]! [facility] introduces [employer] patient portal. Now you can access parts of your medical record, email your doctor's office, and request medication refills online. 1. In your internet browser, go to [email or web address] 2. Click on the First Time User? Click Here link in the Sign In box. You will see the New Member Sign Up page. 3. Enter your Ready[employer] Access Code exactly as it appears below. You will not need to use this code after youve completed the sign-up process. If you do not sign up before the expiration date, you must request a new code. · ReadyCart Access Code: [email or web address] Expires: 12/13/2017  8:38 AM 
 
4. Enter the last four digits of your Social Security Number (xxxx) and Date of Birth (mm/dd/yyyy) as indicated and click Submit. You will be taken to the next sign-up page. 5. Create a ReadyCart ID. This will be your ReadyCart login ID and cannot be changed, so think of one that is secure and easy to remember. 6. Create a ReadyCart password. You can change your password at any time. 7. Enter your Password Reset Question and Answer. This can be used at a later time if you forget your password. 8. Enter your e-mail address. You will receive e-mail notification when new information is available in [address] 9. Click Sign Up. You can now view and download portions of your medical record. 10. Click the Download Summary menu link to download a portable copy of your medical information. If you have questions, please visit the Frequently Asked Questions section of the ReadyCart website.  Remember, ReadyCart is NOT to be used for urgent needs. For medical emergencies, dial 911. Now available from your iPhone and Android! Please provide this summary of care documentation to your next provider. Your primary care clinician is listed as Emely Stewart. If you have any questions after today's visit, please call [phone].

## 2017-11-14 NOTE — PROGRESS NOTES
[facility]  [address], [address], [address]              Patient: Ramírez Daniels  Admitted: (Not on file) MRN: [id number]     Age:  37 y.o.,      Sex: male    YOB: 1975       Thalia Garcia is an 37 y.o. male who is status post Complex 3 Quadrant External and Internal Hemorrhoidectomy on 11/2/2017 for severe symptomatic hemorrhoid disease. He is currently without complaints except for some pain, bleeding and discharge which is improving for all these symptoms. His bowels are regular, and the patient denies fever. Objective    Vitals:    11/14/17 1042   BP: 160/89   Pulse: 85   Resp: 18   Temp: 96.5 °F (35.8 °C)   TempSrc: Oral   SpO2: 100%   Weight: 96.6 kg (213 lb)   Height: 5' 5\" (1.651 m)   PainSc:   8   PainLoc: Rectum       Physical Exam:  General: alert, cooperative, no distress, appears stated age  Anorectal:  With the patient in the prone position the anus appeared within normal limits with healing operative sites. Digital rectal examination revealed increased sphincter tone and squeeze pressure. Palpation revealed No Masses. Assessment / Plan    Mr. Consuelo Russo is Doing well postoperatively. The patient will continue the local wound care until the operative site has completely healed. Return in 2 weeks for follow up.           Erin Dupree MD, FACS, FASCRS  Colon and Rectal Surgery  [facility]  Office [phone]  Fax     [phone]  11/14/2017  12:23 PM

## 2017-11-15 ENCOUNTER — OFFICE VISIT (OUTPATIENT)
Dept: FAMILY MEDICINE CLINIC | Age: 42
End: 2017-11-15

## 2017-11-15 VITALS
DIASTOLIC BLOOD PRESSURE: 108 MMHG | OXYGEN SATURATION: 99 % | WEIGHT: 214 LBS | TEMPERATURE: 97.8 F | RESPIRATION RATE: 16 BRPM | HEIGHT: 65 IN | SYSTOLIC BLOOD PRESSURE: 162 MMHG | BODY MASS INDEX: 35.65 KG/M2 | HEART RATE: 76 BPM

## 2017-11-15 DIAGNOSIS — I10 ESSENTIAL HYPERTENSION: Primary | ICD-10-CM

## 2017-11-15 DIAGNOSIS — M25.572 LEFT ANKLE PAIN, UNSPECIFIED CHRONICITY: ICD-10-CM

## 2017-11-15 RX ORDER — TRAMADOL HYDROCHLORIDE 50 MG/1
50 TABLET ORAL
Qty: 20 TAB | Refills: 0 | Status: SHIPPED | OUTPATIENT
Start: 2017-11-15 | End: 2019-02-25

## 2017-11-15 NOTE — PROGRESS NOTES
1. Have you been to the ER, urgent care clinic since your last visit? Hospitalized since your last visit? No    2. Have you seen or consulted any other health care providers outside of the [address] [place] [address] since your last visit? Include any pap smears or colon screening.  No

## 2017-11-15 NOTE — PROGRESS NOTES
Terra Flowers is a 37 y.o.  male and presents with     Chief Complaint   Patient presents with    Hypertension    Ankle Pain    Hemorrhoids       Pt has chronic left ankle pain. Pt saw ortho. Has follow up appt in Dec.   Pt had surgery for hemorrhoids. Pt says he is taking his blood pressure meds  Not done blood test yet as advised      Past Medical History:   Diagnosis Date    Chronic pain     back    GERD (gastroesophageal reflux disease)     Hypertension     Kidney stone      Past Surgical History:   Procedure Laterality Date    COLONOSCOPY N/A 9/15/2017    COLONOSCOPY performed by Abraham Ramos MD at [facility]      left ankle, hardware in place     Current Outpatient Prescriptions   Medication Sig    traMADol (ULTRAM) 50 mg tablet Take 1 Tab by mouth every six (6) hours as needed for Pain. Max Daily Amount: 200 mg.    metoprolol tartrate (LOPRESSOR) 50 mg tablet Take 1 Tab by mouth two (2) times a day.  hydroCHLOROthiazide (HYDRODIURIL) 25 mg tablet Take 1 Tab by mouth daily.  losartan (COZAAR) 50 mg tablet Take 1 Tab by mouth two (2) times a day.  amoxicillin 500 mg tab Take  by mouth. No current facility-administered medications for this visit. Health Maintenance   Topic Date Due    Pneumococcal 19-64 Medium Risk (1 of 1 - PPSV23) 12/04/1995    DTaP/Tdap/Td series (1 - Tdap) 12/04/1997    COLONOSCOPY  09/15/2027    Influenza Age 9 to Adult  Addressed       There is no immunization history on file for this patient. No LMP for male patient. Allergies and Intolerances: Allergies   Allergen Reactions    Lisinopril Other (comments)     Sore throat       Family History:   No family history on file. Social History:   He  reports that he has been smoking Cigarettes. He has been smoking about 1.50 packs per day. He has never used smokeless tobacco.  He  reports that he drinks alcohol.             Review of Systems:   General: negative for - chills, fatigue, fever, weight change  Psych: negative for - anxiety, depression, irritability or mood swings  ENT: negative for - headaches, hearing change, nasal congestion, oral lesions, sneezing or sore throat  Heme/ Lymph: negative for - bleeding problems, bruising, pallor or swollen lymph nodes  Endo: negative for - hot flashes, polydipsia/polyuria or temperature intolerance  Resp: negative for - cough, shortness of breath or wheezing  CV: negative for - chest pain, edema or palpitations  GI: negative for - abdominal pain, change in bowel habits, constipation, diarrhea or nausea/vomiting  : negative for - dysuria, hematuria, incontinence, pelvic pain or vulvar/vaginal symptoms  MSK: negative for - joint pain, joint swelling or muscle pain, pos for left ankle pain  Neuro: negative for - confusion, headaches, seizures or weakness  Derm: negative for - dry skin, hair changes, rash or skin lesion changes          Physical:   Vitals:   Vitals:    11/15/17 1405   BP: (!) 162/108   Pulse: 76   Resp: 16   Temp: 97.8 °F (36.6 °C)   TempSrc: Oral   SpO2: 99%   Weight: 214 lb (97.1 kg)   Height: 5' 5\" (1.651 m)           Exam:   HEENT- atraumatic,normocephalic, awake, oriented, well nourished  Neck - supple,no enlarged lymph nodes, no JVD, no thyromegaly  Chest- CTA, no rhonchi, no crackles  Heart- rrr, no murmurs / gallop/rub  Abdomen- soft,BS+,NT, no hepatosplenomegaly  Ext - no c/c/edema   Neuro- no focal deficits. Power 5/5 all extremities  Skin - warm,dry, no obvious rashes.           Review of Data:   LABS:   Lab Results   Component Value Date/Time    WBC 4.9 05/24/2017 09:00 AM    HGB 14.5 05/24/2017 09:00 AM    HCT 42.5 05/24/2017 09:00 AM    PLATELET 640 94/10/6637 09:00 AM     Lab Results   Component Value Date/Time    Sodium 139 05/24/2017 09:00 AM    Potassium 4.8 11/02/2017 10:00 AM    Chloride 106 05/24/2017 09:00 AM    CO2 24 05/24/2017 09:00 AM    Glucose 138 05/24/2017 09:00 AM    BUN 10 05/24/2017 09:00 AM Creatinine 1.12 05/24/2017 09:00 AM     No results found for: CHOL, CHOLX, CHLST, CHOLV, HDL, LDL, LDLC, DLDLP, TGLX, TRIGL, TRIGP  No results found for: GPT        Impression / Plan:        ICD-10-CM ICD-9-CM    1. Essential hypertension I10 401.9 CBC WITH AUTOMATED DIFF      METABOLIC PANEL, COMPREHENSIVE      LIPID PANEL      URIC ACID      METANEPHRINES, PLASMA   2. Left ankle pain, unspecified chronicity M25.572 719.47 traMADol (ULTRAM) 50 mg tablet     Low salt diet. Asked pt to do labs. Asked pt to check with Ortho regarding his ankle pain, he has h/o surgery in the past.    Explained to patient risk benefits of the medications. Advised patient to stop meds if having any side effects. Pt verbalized understanding of the instructions. I have discussed the diagnosis with the patient and the intended plan as seen in the above orders. The patient has received an after-visit summary and questions were answered concerning future plans. I have discussed medication side effects and warnings with the patient as well. I have reviewed the plan of care with the patient, accepted their input and they are in agreement with the treatment goals. Reviewed plan of care. Patient has provided input and agrees with goals.     Follow-up Disposition: Not on Val Smallwood MD

## 2017-11-15 NOTE — MR AVS SNAPSHOT
Visit Information Date & Time Provider Department Dept. Phone Encounter #  
 11/15/2017  1:45 PM Gentry Cline, [address] [facility] [phone] Follow-up Instructions Return in about 1 month (around 12/15/2017). Your Appointments 11/28/2017  9:30 AM  
POST OP with Aaron Melchor MD  
[address]) Appt Note: 2 week post op [address] [facility] 405 [facility] 83 700 [place]  
  
   
 [address] [facility] 88 710 [facility] 95 Upcoming Health Maintenance Date Due Pneumococcal 19-64 Medium Risk (1 of 1 - PPSV23) 12/4/1995 DTaP/Tdap/Td series (1 - Tdap) 12/4/1997 COLONOSCOPY 9/15/2027 Allergies as of 11/15/2017  Review Complete On: 11/15/2017 By: Lilia Schneider LPN Severity Noted Reaction Type Reactions Lisinopril  08/14/2017    Other (comments) Sore throat Current Immunizations  Never Reviewed No immunizations on file. Not reviewed this visit You Were Diagnosed With   
  
 Codes Comments Essential hypertension    -  Primary ICD-10-CM: I10 
ICD-9-CM: 401.9 Left ankle pain, unspecified chronicity     ICD-10-CM: M25.572 ICD-9-CM: 719.47 Vitals BP Pulse Temp Resp Height(growth percentile) Weight(growth percentile) (!) 162/108 76 97.8 °F (36.6 °C) (Oral) 16 5' 5\" (1.651 m) 214 lb (97.1 kg) SpO2 BMI Smoking Status 99% 35.61 kg/m2 Current Every Day Smoker Vitals History BMI and BSA Data Body Mass Index Body Surface Area  
 35.61 kg/m 2 2.11 m 2 Preferred Pharmacy Pharmacy Name Phone [facility] [address], [address] [place] [address] Talia [phone] Your Updated Medication List  
  
   
This list is accurate as of: 11/15/17  2:30 PM.  Always use your most recent med list.  
  
  
  
  
 amoxicillin 500 mg Tab Take  by mouth. hydroCHLOROthiazide 25 mg tablet Commonly known as:  HYDRODIURIL Take 1 Tab by mouth daily. losartan 50 mg tablet Commonly known as:  COZAAR Take 1 Tab by mouth two (2) times a day. metoprolol tartrate 50 mg tablet Commonly known as:  LOPRESSOR Take 1 Tab by mouth two (2) times a day. traMADol 50 mg tablet Commonly known as:  ULTRAM  
Take 1 Tab by mouth every six (6) hours as needed for Pain. Max Daily Amount: 200 mg. Prescriptions Printed Refills  
 traMADol (ULTRAM) 50 mg tablet 0 Sig: Take 1 Tab by mouth every six (6) hours as needed for Pain. Max Daily Amount: 200 mg. Class: Print Route: Oral  
  
Follow-up Instructions Return in about 1 month (around 12/15/2017). To-Do List   
 11/15/2017 Lab:  CBC WITH AUTOMATED DIFF   
  
 11/15/2017 Lab:  LIPID PANEL   
  
 11/15/2017 Lab:  METABOLIC PANEL, COMPREHENSIVE   
  
 11/15/2017 Lab:  METANEPHRINES, PLASMA   
  
 11/15/2017 Lab:  URIC ACID Introducing [facility]! [employer] introduces Transpond patient portal. Now you can access parts of your medical record, email your doctor's office, and request medication refills online. 1. In your internet browser, go to [email or web address] 2. Click on the First Time User? Click Here link in the Sign In box. You will see the New Member Sign Up page. 3. Enter your Transpond Access Code exactly as it appears below. You will not need to use this code after youve completed the sign-up process. If you do not sign up before the expiration date, you must request a new code. · Transpond Access Code: [email or web address] Expires: 12/13/2017  8:38 AM 
 
4. Enter the last four digits of your Social Security Number (xxxx) and Date of Birth (mm/dd/yyyy) as indicated and click Submit. You will be taken to the next sign-up page. 5. Create a Transpond ID.  This will be your Transpond login ID and cannot be changed, so think of one that is secure and easy to remember. 6. Create a TapFunder password. You can change your password at any time. 7. Enter your Password Reset Question and Answer. This can be used at a later time if you forget your password. 8. Enter your e-mail address. You will receive e-mail notification when new information is available in [address] 9. Click Sign Up. You can now view and download portions of your medical record. 10. Click the Download Summary menu link to download a portable copy of your medical information. If you have questions, please visit the Frequently Asked Questions section of the TapFunder website. Remember, TapFunder is NOT to be used for urgent needs. For medical emergencies, dial 911. Now available from your iPhone and Android! Please provide this summary of care documentation to your next provider. Your primary care clinician is listed as Emely Stewart. If you have any questions after today's visit, please call [phone].

## 2017-11-16 LAB
A-G RATIO,AGRAT: 1.4 RATIO (ref 1.1–2.6)
ABSOLUTE LYMPHOCYTE COUNT, 10803: 2.7 K/UL (ref 1–4.8)
ALBUMIN SERPL-MCNC: 4.2 G/DL (ref 3.5–5)
ALP SERPL-CCNC: 60 U/L (ref 25–115)
ALT SERPL-CCNC: 10 U/L (ref 5–40)
ANION GAP SERPL CALC-SCNC: 17 MMOL/L
AST SERPL W P-5'-P-CCNC: 12 U/L (ref 10–37)
BASOPHILS # BLD: 0 K/UL (ref 0–0.2)
BASOPHILS NFR BLD: 0 % (ref 0–2)
BILIRUB SERPL-MCNC: 0.5 MG/DL (ref 0.2–1.2)
BUN SERPL-MCNC: 21 MG/DL (ref 6–22)
CALCIUM SERPL-MCNC: 9.7 MG/DL (ref 8.4–10.4)
CHLORIDE SERPL-SCNC: 99 MMOL/L (ref 98–110)
CHOLEST SERPL-MCNC: 247 MG/DL (ref 110–200)
CO2 SERPL-SCNC: 25 MMOL/L (ref 20–32)
CREAT SERPL-MCNC: 1 MG/DL (ref 0.5–1.2)
EOSINOPHIL # BLD: 0.4 K/UL (ref 0–0.5)
EOSINOPHIL NFR BLD: 5 % (ref 0–6)
ERYTHROCYTE [DISTWIDTH] IN BLOOD BY AUTOMATED COUNT: 13.8 % (ref 10–16)
GFRAA, 66117: >60
GFRNA, 66118: >60
GLOBULIN,GLOB: 2.9 G/DL (ref 2–4)
GLUCOSE SERPL-MCNC: 90 MG/DL (ref 70–99)
GRANULOCYTES,GRANS: 51 % (ref 40–75)
HCT VFR BLD AUTO: 39.4 % (ref 36.6–51.9)
HDLC SERPL-MCNC: 34 MG/DL (ref 40–59)
HGB BLD-MCNC: 13 G/DL (ref 13.2–17.3)
LDLC SERPL CALC-MCNC: 168 MG/DL (ref 50–99)
LYMPHOCYTES, LYMLT: 36 % (ref 27–45)
MCH RBC QN AUTO: 30 PG (ref 26–34)
MCHC RBC AUTO-ENTMCNC: 33 G/DL (ref 32–36)
MCV RBC AUTO: 92 FL (ref 80–95)
MONOCYTES # BLD: 0.6 K/UL (ref 0.1–0.9)
MONOCYTES NFR BLD: 7 % (ref 3–9)
NEUTROPHILS # BLD AUTO: 3.9 K/UL (ref 1.8–7.7)
PLATELET # BLD AUTO: 399 K/UL (ref 140–440)
PMV BLD AUTO: 10.7 FL (ref 6–10.8)
POTASSIUM SERPL-SCNC: 3.6 MMOL/L (ref 3.5–5.5)
PROT SERPL-MCNC: 7.1 G/DL (ref 6.4–8.3)
RBC # BLD AUTO: 4.29 M/UL (ref 3.8–5.8)
SODIUM SERPL-SCNC: 141 MMOL/L (ref 133–145)
TRIGL SERPL-MCNC: 224 MG/DL (ref 40–149)
URATE SERPL-MCNC: 6 MG/DL (ref 3.9–9)
VLDLC SERPL CALC-MCNC: 45 MG/DL (ref 8–30)
WBC # BLD AUTO: 7.5 K/UL (ref 4–11)

## 2017-11-20 LAB
METANEPHRINES,PLASMA, 10832: 39 PG/ML
NORMETANEPHRINE, PL, 070013: 89 PG/ML

## 2017-11-22 ENCOUNTER — OFFICE VISIT (OUTPATIENT)
Dept: FAMILY MEDICINE CLINIC | Age: 42
End: 2017-11-22

## 2017-11-22 VITALS
WEIGHT: 212.6 LBS | HEART RATE: 85 BPM | OXYGEN SATURATION: 95 % | HEIGHT: 65 IN | BODY MASS INDEX: 35.42 KG/M2 | TEMPERATURE: 97.9 F | DIASTOLIC BLOOD PRESSURE: 105 MMHG | SYSTOLIC BLOOD PRESSURE: 161 MMHG | RESPIRATION RATE: 18 BRPM

## 2017-11-22 DIAGNOSIS — E78.00 HYPERCHOLESTEREMIA: ICD-10-CM

## 2017-11-22 DIAGNOSIS — I10 ESSENTIAL HYPERTENSION: Primary | ICD-10-CM

## 2017-11-22 DIAGNOSIS — L02.92 FURUNCLE: ICD-10-CM

## 2017-11-22 RX ORDER — ATORVASTATIN CALCIUM 20 MG/1
20 TABLET, FILM COATED ORAL DAILY
Qty: 30 TAB | Refills: 3 | Status: SHIPPED | OUTPATIENT
Start: 2017-11-22 | End: 2019-02-28 | Stop reason: SDUPTHER

## 2017-11-22 RX ORDER — AMLODIPINE BESYLATE 10 MG/1
10 TABLET ORAL DAILY
Qty: 30 TAB | Refills: 3 | Status: SHIPPED | OUTPATIENT
Start: 2017-11-22 | End: 2019-02-25 | Stop reason: SDUPTHER

## 2017-11-22 RX ORDER — AMOXICILLIN AND CLAVULANATE POTASSIUM 875; 125 MG/1; MG/1
1 TABLET, FILM COATED ORAL 2 TIMES DAILY
Qty: 14 TAB | Refills: 0 | Status: SHIPPED | OUTPATIENT
Start: 2017-11-22 | End: 2017-11-29

## 2017-11-22 RX ORDER — MUPIROCIN 20 MG/G
OINTMENT TOPICAL DAILY
Qty: 22 G | Refills: 3 | Status: SHIPPED | OUTPATIENT
Start: 2017-11-22 | End: 2019-02-25

## 2017-11-22 NOTE — PROGRESS NOTES
Chief Complaint   Patient presents with    Hypertension    Leg Problem     right leg, by ankle      1. Have you been to the ER, urgent care clinic since your last visit? Hospitalized since your last visit? No    2. Have you seen or consulted any other health care providers outside of the [address] [place] [address] since your last visit? Include any pap smears or colon screening.  No

## 2017-11-22 NOTE — PROGRESS NOTES
Elise Thrasher is a 37 y.o.  male and presents with     Chief Complaint   Patient presents with    Hypertension    Leg Problem     right leg, by ankle     Cholesterol Problem     Pt says he saw Ortho for his leg pain and was offered injections in the leg where he has the pain. However he noticed mild swelling over the scar tissue on his leg where he had the surgery done in the past.  Blood pressure is up., pt says he is taking his meds. Past Medical History:   Diagnosis Date    Chronic pain     back    GERD (gastroesophageal reflux disease)     Hypertension     Kidney stone      Past Surgical History:   Procedure Laterality Date    COLONOSCOPY N/A 9/15/2017    COLONOSCOPY performed by Jagjit Trivedi MD at [facility]      left ankle, hardware in place     Current Outpatient Prescriptions   Medication Sig    atorvastatin (LIPITOR) 20 mg tablet Take 1 Tab by mouth daily.  amoxicillin-clavulanate (AUGMENTIN) 875-125 mg per tablet Take 1 Tab by mouth two (2) times a day for 7 days.  mupirocin (BACTROBAN) 2 % ointment Apply  to affected area daily.  amLODIPine (NORVASC) 10 mg tablet Take 1 Tab by mouth daily.  metoprolol tartrate (LOPRESSOR) 50 mg tablet Take 1 Tab by mouth two (2) times a day.  hydroCHLOROthiazide (HYDRODIURIL) 25 mg tablet Take 1 Tab by mouth daily.  losartan (COZAAR) 50 mg tablet Take 1 Tab by mouth two (2) times a day.  traMADol (ULTRAM) 50 mg tablet Take 1 Tab by mouth every six (6) hours as needed for Pain. Max Daily Amount: 200 mg. No current facility-administered medications for this visit. Health Maintenance   Topic Date Due    Pneumococcal 19-64 Medium Risk (1 of 1 - PPSV23) 12/04/1995    DTaP/Tdap/Td series (1 - Tdap) 12/04/1997    COLONOSCOPY  09/15/2027    Influenza Age 9 to Adult  Addressed       There is no immunization history on file for this patient. No LMP for male patient.         Allergies and Intolerances: Allergies   Allergen Reactions    Lisinopril Other (comments)     Sore throat       Family History:   No family history on file. Social History:   He  reports that he has been smoking Cigarettes. He has been smoking about 1.50 packs per day. He has never used smokeless tobacco.  He  reports that he drinks alcohol. Review of Systems:   General: negative for - chills, fatigue, fever, weight change  Psych: negative for - anxiety, depression, irritability or mood swings  ENT: negative for - headaches, hearing change, nasal congestion, oral lesions, sneezing or sore throat  Heme/ Lymph: negative for - bleeding problems, bruising, pallor or swollen lymph nodes  Endo: negative for - hot flashes, polydipsia/polyuria or temperature intolerance  Resp: negative for - cough, shortness of breath or wheezing  CV: negative for - chest pain, edema or palpitations  GI: negative for - abdominal pain, change in bowel habits, constipation, diarrhea or nausea/vomiting  : negative for - dysuria, hematuria, incontinence, pelvic pain or vulvar/vaginal symptoms  MSK: negative for - joint pain, joint swelling or muscle pain  Neuro: negative for - confusion, headaches, seizures or weakness  Derm: negative for - dry skin, hair changes, rash or skin lesion changes, pos for small sweling on his leg          Physical:   Vitals:   Vitals:    11/22/17 0823 11/22/17 0824   BP: (!) 170/120 (!) 161/105   Pulse: 85    Resp: 18    Temp: 97.9 °F (36.6 °C)    TempSrc: Oral    SpO2: 95%    Weight: 212 lb 9.6 oz (96.4 kg)    Height: 5' 5\" (1.651 m)            Exam:   HEENT- atraumatic,normocephalic, awake, oriented, well nourished  Neck - supple,no enlarged lymph nodes, no JVD, no thyromegaly  Chest- CTA, no rhonchi, no crackles  Heart- rrr, no murmurs / gallop/rub  Abdomen- soft,BS+,NT, no hepatosplenomegaly  Ext - no c/c/edema ,small slightly tender swelling overlying the scar tissue on the leg  Neuro- no focal deficits. Power 5/5 all extremities  Skin - warm,dry, no obvious rashes. Review of Data:   LABS:   Lab Results   Component Value Date/Time    WBC 7.5 11/15/2017 02:44 PM    HGB 13.0 11/15/2017 02:44 PM    HCT 39.4 11/15/2017 02:44 PM    PLATELET 037 67/53/5778 02:44 PM     Lab Results   Component Value Date/Time    Sodium 141 11/15/2017 02:44 PM    Potassium 3.6 11/15/2017 02:44 PM    Chloride 99 11/15/2017 02:44 PM    CO2 25 11/15/2017 02:44 PM    Glucose 90 11/15/2017 02:44 PM    BUN 21 11/15/2017 02:44 PM    Creatinine 1.0 11/15/2017 02:44 PM     Lab Results   Component Value Date/Time    Cholesterol, total 247 11/15/2017 02:44 PM    HDL Cholesterol 34 11/15/2017 02:44 PM    LDL, calculated 168 11/15/2017 02:44 PM    Triglyceride 224 11/15/2017 02:44 PM     No results found for: GPT        Impression / Plan:        ICD-10-CM ICD-9-CM    1. Essential hypertension I10 401.9 amLODIPine (NORVASC) 10 mg tablet   2. Hypercholesteremia E78.00 272.0 atorvastatin (LIPITOR) 20 mg tablet   3. Furuncle L02.92 680.9 amoxicillin-clavulanate (AUGMENTIN) 875-125 mg per tablet      mupirocin (BACTROBAN) 2 % ointment     Low salt diet. Explained to patient risk benefits of the medications. Advised patient to stop meds if having any side effects. Pt verbalized understanding of the instructions. I have discussed the diagnosis with the patient and the intended plan as seen in the above orders. The patient has received an after-visit summary and questions were answered concerning future plans. I have discussed medication side effects and warnings with the patient as well. I have reviewed the plan of care with the patient, accepted their input and they are in agreement with the treatment goals. Reviewed plan of care. Patient has provided input and agrees with goals.     Follow-up Disposition: Not on Zac Craig MD

## 2017-11-22 NOTE — MR AVS SNAPSHOT
Visit Information Date & Time Provider Department Dept. Phone Encounter #  
 11/22/2017  8:00 AM Michele Marques, [place] [facility] [phone] [id number] Follow-up Instructions Return for keep appt. Your Appointments 11/28/2017  9:30 AM  
POST OP with Luiaz Kelly MD  
[address] [place] [facility] [facility]) Appt Note: 2 week post op [facility] 405 [facility] [address] [facility] Saadia De Gasperi [address]  
  
    
 12/15/2017 10:15 AM  
ROUTINE CARE with Michele Marques MD  
[facility]) Appt Note: Return in about 1 month (around 12/15/2017). [facility] [address] [facility] 83 700 [facility] [address] 710 [facility] [address] Upcoming Health Maintenance Date Due Pneumococcal 19-64 Medium Risk (1 of 1 - PPSV23) 12/4/1995 DTaP/Tdap/Td series (1 - Tdap) 12/4/1997 COLONOSCOPY 9/15/2027 Allergies as of 11/22/2017  Review Complete On: 11/22/2017 By: Denis Mares LPN Severity Noted Reaction Type Reactions Lisinopril  08/14/2017    Other (comments) Sore throat Current Immunizations  Never Reviewed No immunizations on file. Not reviewed this visit You Were Diagnosed With   
  
 Codes Comments Essential hypertension    -  Primary ICD-10-CM: I10 
ICD-9-CM: 401.9 Hypercholesteremia     ICD-10-CM: E78.00 ICD-9-CM: 272.0 Furuncle     ICD-10-CM: L02.92 
ICD-9-CM: 680.9 Vitals BP Pulse Temp Resp Height(growth percentile) Weight(growth percentile) (!) 161/105 (BP 1 Location: Right arm, BP Patient Position: At rest) 85 97.9 °F (36.6 °C) (Oral) 18 5' 5\" (1.651 m) 212 lb 9.6 oz (96.4 kg) SpO2 BMI Smoking Status 95% 35.38 kg/m2 Current Every Day Smoker Vitals History BMI and BSA Data Body Mass Index Body Surface Area 35.38 kg/m 2 2.1 m 2 Preferred Pharmacy Pharmacy Name Phone [facility] [address], [address] [phone] Your Updated Medication List  
  
   
This list is accurate as of: 11/22/17  8:49 AM.  Always use your most recent med list. amLODIPine 10 mg tablet Commonly known as:  Salma Pace Take 1 Tab by mouth daily. amoxicillin-clavulanate 875-125 mg per tablet Commonly known as:  AUGMENTIN Take 1 Tab by mouth two (2) times a day for 7 days. atorvastatin 20 mg tablet Commonly known as:  LIPITOR Take 1 Tab by mouth daily. hydroCHLOROthiazide 25 mg tablet Commonly known as:  HYDRODIURIL Take 1 Tab by mouth daily. losartan 50 mg tablet Commonly known as:  COZAAR Take 1 Tab by mouth two (2) times a day. metoprolol tartrate 50 mg tablet Commonly known as:  LOPRESSOR Take 1 Tab by mouth two (2) times a day. mupirocin 2 % ointment Commonly known as:  [facility] Apply  to affected area daily. traMADol 50 mg tablet Commonly known as:  ULTRAM  
Take 1 Tab by mouth every six (6) hours as needed for Pain. Max Daily Amount: 200 mg. Prescriptions Sent to Pharmacy Refills  
 atorvastatin (LIPITOR) 20 mg tablet 3 Sig: Take 1 Tab by mouth daily. Class: Normal  
 Pharmacy: [facility] [address], [address] Ph #: [phone] Route: Oral  
 amoxicillin-clavulanate (AUGMENTIN) 875-125 mg per tablet 0 Sig: Take 1 Tab by mouth two (2) times a day for 7 days. Class: Normal  
 Pharmacy: [facility] [address], [address] Ph #: [phone] Route: Oral  
 mupirocin (BACTROBAN) 2 % ointment 3 Sig: Apply  to affected area daily. Class: Normal  
 Pharmacy: [facility] [address], [address] Ph #: [phone]  Route: Topical  
 amLODIPine (NORVASC) 10 mg tablet 3  
 Sig: Take 1 Tab by mouth daily. Class: Normal  
 Pharmacy: [facility] [address], [address] Ph #: [phone] Route: Oral  
  
Follow-up Instructions Return for keep appt. Introducing [facility]! [facility] introduces Peel patient portal. Now you can access parts of your medical record, email your doctor's office, and request medication refills online. 1. In your internet browser, go to [email or web address] 2. Click on the First Time User? Click Here link in the Sign In box. You will see the New Member Sign Up page. 3. Enter your Peel Access Code exactly as it appears below. You will not need to use this code after youve completed the sign-up process. If you do not sign up before the expiration date, you must request a new code. · Peel Access Code: [email or web address] Expires: 12/13/2017  8:38 AM 
 
4. Enter the last four digits of your Social Security Number (xxxx) and Date of Birth (mm/dd/yyyy) as indicated and click Submit. You will be taken to the next sign-up page. 5. Create a Peel ID. This will be your Peel login ID and cannot be changed, so think of one that is secure and easy to remember. 6. Create a Peel password. You can change your password at any time. 7. Enter your Password Reset Question and Answer. This can be used at a later time if you forget your password. 8. Enter your e-mail address. You will receive e-mail notification when new information is available in [address] 9. Click Sign Up. You can now view and download portions of your medical record. 10. Click the Download Summary menu link to download a portable copy of your medical information. If you have questions, please visit the Frequently Asked Questions section of the Peel website. Remember, Peel is NOT to be used for urgent needs. For medical emergencies, dial 911. Now available from your iPhone and Android! Please provide this summary of care documentation to your next provider. Your primary care clinician is listed as Enedina Garcia. If you have any questions after today's visit, please call [phone].

## 2017-11-28 ENCOUNTER — OFFICE VISIT (OUTPATIENT)
Dept: SURGERY | Age: 42
End: 2017-11-28

## 2017-11-28 VITALS
DIASTOLIC BLOOD PRESSURE: 121 MMHG | HEART RATE: 72 BPM | WEIGHT: 212 LBS | SYSTOLIC BLOOD PRESSURE: 173 MMHG | TEMPERATURE: 97.3 F | BODY MASS INDEX: 35.28 KG/M2

## 2017-11-28 DIAGNOSIS — K64.8 INTERNAL AND EXTERNAL PROLAPSED HEMORRHOIDS: Primary | ICD-10-CM

## 2017-11-28 NOTE — MR AVS SNAPSHOT
Visit Information Date & Time Provider Department Dept. Phone Encounter #  
 11/28/2017  9:30 AM Rosita Barnett MD [facility] [phone] [id number] Your Appointments 12/15/2017 10:15 AM  
ROUTINE CARE with Ilana Vyas MD  
[facility] [address] [facility]) Appt Note: Return in about 1 month (around 12/15/2017). [address] [facility] [address]  
  
   
 [address] [place] [address] [address] Upcoming Health Maintenance Date Due Pneumococcal 19-64 Medium Risk (1 of 1 - PPSV23) 12/4/1995 DTaP/Tdap/Td series (1 - Tdap) 12/4/1997 COLONOSCOPY 9/15/2027 Allergies as of 11/28/2017  Review Complete On: 11/28/2017 By: Rosita Barnett MD  
  
 Severity Noted Reaction Type Reactions Lisinopril  08/14/2017    Other (comments) Sore throat Current Immunizations  Never Reviewed No immunizations on file. Not reviewed this visit You Were Diagnosed With   
  
 Codes Comments Internal and external prolapsed hemorrhoids    -  Primary ICD-10-CM: D56.4 ICD-9-CM: 455.2, 455.5 Vitals BP Pulse Temp Weight(growth percentile) BMI Smoking Status (!) 173/121 (BP 1 Location: Left arm, BP Patient Position: At rest) 72 97.3 °F (36.3 °C) (Oral) 212 lb (96.2 kg) 35.28 kg/m2 Current Every Day Smoker Vitals History BMI and BSA Data Body Mass Index Body Surface Area  
 35.28 kg/m 2 2.1 m 2 Preferred Pharmacy Pharmacy Name Phone [facility] [address], [address] [facility] [phone] Your Updated Medication List  
  
   
This list is accurate as of: 11/28/17  9:54 AM.  Always use your most recent med list. amLODIPine 10 mg tablet Commonly known as:  Yovany Momin Take 1 Tab by mouth daily. amoxicillin-clavulanate 875-125 mg per tablet Commonly known as:  AUGMENTIN  
 Take 1 Tab by mouth two (2) times a day for 7 days. atorvastatin 20 mg tablet Commonly known as:  LIPITOR Take 1 Tab by mouth daily. hydroCHLOROthiazide 25 mg tablet Commonly known as:  HYDRODIURIL Take 1 Tab by mouth daily. losartan 50 mg tablet Commonly known as:  COZAAR Take 1 Tab by mouth two (2) times a day. metoprolol tartrate 50 mg tablet Commonly known as:  LOPRESSOR Take 1 Tab by mouth two (2) times a day. mupirocin 2 % ointment Commonly known as:  [employer] Apply  to affected area daily. traMADol 50 mg tablet Commonly known as:  ULTRAM  
Take 1 Tab by mouth every six (6) hours as needed for Pain. Max Daily Amount: 200 mg. Introducing [facility]! [employer] introduces [employer] patient portal. Now you can access parts of your medical record, email your doctor's office, and request medication refills online. 1. In your internet browser, go to [email or web address] [email or web address] [email or web address] 5th Quarter 2. Click on the First Time User? Click Here link in the Sign In box. You will see the New Member Sign Up page. 3. Enter your [employer] Access Code exactly as it appears below. You will not need to use this code after youve completed the sign-up process. If you do not sign up before the expiration date, you must request a new code. · [employer] Access Code: [email or web address] Expires: 12/13/2017  8:38 AM 
 
4. Enter the last four digits of your Social Security Number (xxxx) and Date of Birth (mm/dd/yyyy) as indicated and click Submit. You will be taken to the next sign-up page. 5. Create a Integrated Plas[employer] ID. This will be your [employer] login ID and cannot be changed, so think of one that is secure and easy to remember. 6. Create a [employer] password. You can change your password at any time. 7. Enter your Password Reset Question and Answer. This can be used at a later time if you forget your password. 8. Enter your e-mail address. You will receive e-mail notification when new information is available in [address] 9. Click Sign Up. You can now view and download portions of your medical record. 10. Click the Download Summary menu link to download a portable copy of your medical information. If you have questions, please visit the Frequently Asked Questions section of the [email or web address] website. Remember, [email or web address] is NOT to be used for urgent needs. For medical emergencies, dial 911. Now available from your iPhone and Android! Please provide this summary of care documentation to your next provider. Your primary care clinician is listed as Muriel [place]. If you have any questions after today's visit, please call [phone].

## 2017-11-28 NOTE — PROGRESS NOTES
Ashutosh Ventura is a 37 y.o. male who presents today with   Chief Complaint   Patient presents with    Surgical Follow-up     Hemmorrhoidectomy 11/2/2017                1. Have you been to the ER, urgent care clinic since your last visit? Hospitalized since your last visit? No    2. Have you seen or consulted any other health care providers outside of the [address] [place] [address] since your last visit? Include any pap smears or colon screening.  No

## 2017-11-28 NOTE — PROGRESS NOTES
[facility]  [address] [facility], [address], [address] [facility]              Patient: Michael Aguiar  Admitted: (Not on file) MRN: [id number]     Age:  37 y.o.,      Sex: male    YOB: 1975       Thalia Greenberg is an 37 y.o. male who is status post Complex 3 Quadrant External and Internal Hemorrhoidectomy on 11/2/2017 for severe symptomatic hemorrhoid disease.      He is doing well and without complaints. His bowels are regular, and the patient denies fever. Objective    Vitals:    11/28/17 0938 11/28/17 0940   BP: (!) 174/120 (!) 173/121   Pulse: 72    Temp: 97.3 °F (36.3 °C)    TempSrc: Oral    Weight: 96.2 kg (212 lb)    PainSc:   4    PainLoc: Rectum        Physical Exam:  General: alert, cooperative, no distress, appears stated age  Anorectal:  With the patient in the prone position the anus appeared within normal limits with well healing operative sites. Digital rectal examination revealed increased sphincter tone and squeeze pressure. Palpation revealed No Masses. Assessment / Plan    Mr. Michaela Castro is doing well postoperatively. The patient will return as needed for follow up.           Hilton Ge MD, FACS, FASCRS  Colon and Rectal Surgery  [facility]  Office [phone]  Fax     [phone]  11/28/2017  9:54 AM

## 2017-12-21 ENCOUNTER — DOCUMENTATION ONLY (OUTPATIENT)
Dept: FAMILY MEDICINE CLINIC | Age: 42
End: 2017-12-21

## 2017-12-21 NOTE — LETTER
12/21/2017 Nicholas Fernandes [address] [facility] [address] [phone] Dear Mr. Nicholas Fernandes, We had an appointment reserved for you 12/15/2017 and were concerned when you did not show or call within 24 hours to cancel the appointment. As mentioned in a previous letter to you, appointment time is limited and no-showed appointments may prevent another sick individual who needs to be seen from getting a preferred appointment time. Please note that a continued pattern of not showing for appointments may result in your inability to pre-book future appointments as well as possible discharge from the practice. Please call us at your earliest convenience to reschedule your appointment as your provider felt it was important to see you. Thank you for your anticipated cooperation. Sincerely, The scheduling staff [address] [place] [address] [phone]

## 2018-02-08 DIAGNOSIS — I10 ESSENTIAL HYPERTENSION: ICD-10-CM

## 2018-02-08 RX ORDER — LOSARTAN POTASSIUM 50 MG/1
TABLET ORAL
Qty: 60 TAB | Refills: 1 | Status: SHIPPED | OUTPATIENT
Start: 2018-02-08 | End: 2019-02-25 | Stop reason: SDUPTHER

## 2018-02-08 RX ORDER — METOPROLOL TARTRATE 25 MG/1
TABLET, FILM COATED ORAL
Qty: 60 TAB | Refills: 1 | Status: SHIPPED | OUTPATIENT
Start: 2018-02-08 | End: 2019-02-25

## 2018-08-13 ENCOUNTER — TELEPHONE (OUTPATIENT)
Dept: FAMILY MEDICINE CLINIC | Age: 43
End: 2018-08-13

## 2018-09-02 DIAGNOSIS — I10 ESSENTIAL HYPERTENSION: ICD-10-CM

## 2018-09-02 NOTE — LETTER
9/5/2018 4:00 PM 
 
Mr. Faraz Beard [address] [id number] Dear Mr. Devon Maldonado: 
 
Jeferson Augustine missed you! Please call our office at [phone] and schedule a follow up appointment for your continued care.  
 
 
 
Sincerely, 
 
 
[address] [address] Jessy Melgar MD

## 2018-09-03 RX ORDER — HYDROCHLOROTHIAZIDE 25 MG/1
TABLET ORAL
Qty: 30 TAB | Refills: 3 | OUTPATIENT
Start: 2018-09-03

## 2018-12-23 ENCOUNTER — APPOINTMENT (OUTPATIENT)
Dept: NON INVASIVE DIAGNOSTICS | Age: 43
DRG: 189 | End: 2018-12-23
Attending: FAMILY MEDICINE
Payer: SELF-PAY

## 2018-12-23 ENCOUNTER — HOSPITAL ENCOUNTER (INPATIENT)
Age: 43
LOS: 1 days | Discharge: LEFT BEFORE DISCHARGE | DRG: 189 | End: 2018-12-24
Attending: EMERGENCY MEDICINE | Admitting: FAMILY MEDICINE
Payer: SELF-PAY

## 2018-12-23 ENCOUNTER — APPOINTMENT (OUTPATIENT)
Dept: GENERAL RADIOLOGY | Age: 43
DRG: 189 | End: 2018-12-23
Attending: EMERGENCY MEDICINE
Payer: SELF-PAY

## 2018-12-23 DIAGNOSIS — R06.02 SOB (SHORTNESS OF BREATH): Primary | ICD-10-CM

## 2018-12-23 DIAGNOSIS — R77.8 ELEVATED TROPONIN: ICD-10-CM

## 2018-12-23 DIAGNOSIS — R94.31 PROLONGED QT INTERVAL: ICD-10-CM

## 2018-12-23 DIAGNOSIS — J81.0 ACUTE PULMONARY EDEMA (HCC): ICD-10-CM

## 2018-12-23 PROBLEM — I16.1 HYPERTENSIVE EMERGENCY: Status: ACTIVE | Noted: 2018-12-23

## 2018-12-23 LAB
ALBUMIN SERPL-MCNC: 3.6 G/DL (ref 3.4–5)
ALBUMIN/GLOB SERPL: 1 {RATIO} (ref 0.8–1.7)
ALP SERPL-CCNC: 107 U/L (ref 45–117)
ALT SERPL-CCNC: 74 U/L (ref 16–61)
AMPHET UR QL SCN: NEGATIVE
ANION GAP SERPL CALC-SCNC: 9 MMOL/L (ref 3–18)
ARTERIAL PATENCY WRIST A: YES
AST SERPL-CCNC: 63 U/L (ref 15–37)
ATRIAL RATE: 101 BPM
BARBITURATES UR QL SCN: NEGATIVE
BASE EXCESS BLDV CALC-SCNC: 0 MMOL/L
BASOPHILS # BLD: 0 K/UL (ref 0–0.1)
BASOPHILS NFR BLD: 0 % (ref 0–2)
BDY SITE: ABNORMAL
BENZODIAZ UR QL: NEGATIVE
BILIRUB SERPL-MCNC: 0.5 MG/DL (ref 0.2–1)
BNP SERPL-MCNC: 224 PG/ML (ref 0–450)
BODY TEMPERATURE: 97.8
BUN SERPL-MCNC: 9 MG/DL (ref 7–18)
BUN/CREAT SERPL: 9 (ref 12–20)
CALCIUM SERPL-MCNC: 8.3 MG/DL (ref 8.5–10.1)
CALCULATED P AXIS, ECG09: 49 DEGREES
CALCULATED R AXIS, ECG10: 67 DEGREES
CALCULATED T AXIS, ECG11: 39 DEGREES
CANNABINOIDS UR QL SCN: NEGATIVE
CHLORIDE SERPL-SCNC: 111 MMOL/L (ref 100–108)
CO2 SERPL-SCNC: 24 MMOL/L (ref 21–32)
COCAINE UR QL SCN: NEGATIVE
CREAT SERPL-MCNC: 0.95 MG/DL (ref 0.6–1.3)
DIAGNOSIS, 93000: NORMAL
DIFFERENTIAL METHOD BLD: ABNORMAL
ECHO AV PEAK GRADIENT: 0 MMHG
ECHO AV PEAK VELOCITY: 0 CM/S
ECHO EST RA PRESSURE: 10 MMHG
ECHO LA VOL 2C: 102.85 ML (ref 18–58)
ECHO LA VOL 4C: 85.11 ML (ref 18–58)
ECHO LA VOLUME INDEX A2C: 53.75 ML/M2 (ref 16–28)
ECHO LA VOLUME INDEX A4C: 44.48 ML/M2 (ref 16–28)
ECHO LV EDV A2C: 156.8 ML
ECHO LV EDV A4C: 184.1 ML
ECHO LV EDV BP: 173.1 ML (ref 67–155)
ECHO LV EDV INDEX A4C: 96.2 ML/M2
ECHO LV EDV INDEX BP: 90.5 ML/M2
ECHO LV EDV NDEX A2C: 81.9 ML/M2
ECHO LV EJECTION FRACTION A2C: 58 %
ECHO LV EJECTION FRACTION A4C: 54 %
ECHO LV EJECTION FRACTION BIPLANE: 56.1 % (ref 55–100)
ECHO LV ESV A2C: 65.4 ML
ECHO LV ESV A4C: 84.7 ML
ECHO LV ESV BP: 76 ML (ref 22–58)
ECHO LV ESV INDEX A2C: 34.2 ML/M2
ECHO LV ESV INDEX A4C: 44.3 ML/M2
ECHO LV ESV INDEX BP: 39.7 ML/M2
ECHO LV INTERNAL DIMENSION DIASTOLIC: 5.93 CM (ref 4.2–5.9)
ECHO LV INTERNAL DIMENSION SYSTOLIC: 4.31 CM
ECHO LV ISOVOLUMETRIC RELAXATION TIME (IVRT): 37 MS
ECHO LV IVSD: 1.23 CM (ref 0.6–1)
ECHO LV MASS 2D: 361.4 G (ref 88–224)
ECHO LV MASS INDEX 2D: 188.9 G/M2 (ref 49–115)
ECHO LV POSTERIOR WALL DIASTOLIC: 1.13 CM (ref 0.6–1)
ECHO LVOT DIAM: 2.02 CM
ECHO LVOT PEAK GRADIENT: 4.4 MMHG
ECHO LVOT PEAK VELOCITY: 105.09 CM/S
ECHO PULMONARY ARTERY SYSTOLIC PRESSURE (PASP): 43 MMHG
ECHO RIGHT VENTRICULAR SYSTOLIC PRESSURE (RVSP): 10 MMHG
ECHO TV REGURGITANT MAX VELOCITY: 0 CM/S
ECHO TV REGURGITANT PEAK GRADIENT: 0 MMHG
EOSINOPHIL # BLD: 0.1 K/UL (ref 0–0.4)
EOSINOPHIL NFR BLD: 2 % (ref 0–5)
ERYTHROCYTE [DISTWIDTH] IN BLOOD BY AUTOMATED COUNT: 14.2 % (ref 11.6–14.5)
ETHANOL SERPL-MCNC: 208 MG/DL (ref 0–3)
GAS FLOW.O2 O2 DELIVERY SYS: ABNORMAL L/MIN
GLOBULIN SER CALC-MCNC: 3.5 G/DL (ref 2–4)
GLUCOSE SERPL-MCNC: 93 MG/DL (ref 74–99)
HCO3 BLDV-SCNC: 26 MMOL/L (ref 23–28)
HCT VFR BLD AUTO: 42 % (ref 36–48)
HDSCOM,HDSCOM: NORMAL
HGB BLD-MCNC: 14.3 G/DL (ref 13–16)
LYMPHOCYTES # BLD: 3.1 K/UL (ref 0.9–3.6)
LYMPHOCYTES NFR BLD: 45 % (ref 21–52)
MCH RBC QN AUTO: 31.4 PG (ref 24–34)
MCHC RBC AUTO-ENTMCNC: 34 G/DL (ref 31–37)
MCV RBC AUTO: 92.1 FL (ref 74–97)
METHADONE UR QL: NEGATIVE
MONOCYTES # BLD: 0.6 K/UL (ref 0.05–1.2)
MONOCYTES NFR BLD: 9 % (ref 3–10)
NEUTS SEG # BLD: 3 K/UL (ref 1.8–8)
NEUTS SEG NFR BLD: 44 % (ref 40–73)
O2/TOTAL GAS SETTING VFR VENT: 50 %
OPIATES UR QL: NEGATIVE
P-R INTERVAL, ECG05: 144 MS
PCO2 BLDV: 49.8 MMHG (ref 41–51)
PCP UR QL: NEGATIVE
PEEP RESPIRATORY: 10 CMH2O
PH BLDV: 7.32 [PH] (ref 7.32–7.42)
PIP ISTAT,IPIP: 19
PLATELET # BLD AUTO: 231 K/UL (ref 135–420)
PMV BLD AUTO: 10.3 FL (ref 9.2–11.8)
PO2 BLDV: 25 MMHG (ref 25–40)
POTASSIUM SERPL-SCNC: 4 MMOL/L (ref 3.5–5.5)
PRESSURE SUPPORT SETTING VENT: 10 CMH2O
PROT SERPL-MCNC: 7.1 G/DL (ref 6.4–8.2)
Q-T INTERVAL, ECG07: 418 MS
QRS DURATION, ECG06: 106 MS
QTC CALCULATION (BEZET), ECG08: 542 MS
RBC # BLD AUTO: 4.56 M/UL (ref 4.7–5.5)
SAO2 % BLDV: 42 % (ref 65–88)
SERVICE CMNT-IMP: ABNORMAL
SODIUM SERPL-SCNC: 144 MMOL/L (ref 136–145)
SPECIMEN TYPE: ABNORMAL
TOTAL RESP. RATE, ITRR: 29
TROPONIN I SERPL-MCNC: 0.03 NG/ML (ref 0–0.04)
TROPONIN I SERPL-MCNC: 0.06 NG/ML (ref 0–0.04)
VENTRICULAR RATE, ECG03: 101 BPM
WBC # BLD AUTO: 6.8 K/UL (ref 4.6–13.2)

## 2018-12-23 PROCEDURE — 74011250636 HC RX REV CODE- 250/636: Performed by: EMERGENCY MEDICINE

## 2018-12-23 PROCEDURE — 80307 DRUG TEST PRSMV CHEM ANLYZR: CPT

## 2018-12-23 PROCEDURE — 77010033678 HC OXYGEN DAILY

## 2018-12-23 PROCEDURE — 96365 THER/PROPH/DIAG IV INF INIT: CPT

## 2018-12-23 PROCEDURE — 96375 TX/PRO/DX INJ NEW DRUG ADDON: CPT

## 2018-12-23 PROCEDURE — 83880 ASSAY OF NATRIURETIC PEPTIDE: CPT

## 2018-12-23 PROCEDURE — 96366 THER/PROPH/DIAG IV INF ADDON: CPT

## 2018-12-23 PROCEDURE — 99285 EMERGENCY DEPT VISIT HI MDM: CPT

## 2018-12-23 PROCEDURE — 80053 COMPREHEN METABOLIC PANEL: CPT

## 2018-12-23 PROCEDURE — 74011000250 HC RX REV CODE- 250: Performed by: HOSPITALIST

## 2018-12-23 PROCEDURE — 74011250637 HC RX REV CODE- 250/637: Performed by: EMERGENCY MEDICINE

## 2018-12-23 PROCEDURE — 71045 X-RAY EXAM CHEST 1 VIEW: CPT

## 2018-12-23 PROCEDURE — 74011250636 HC RX REV CODE- 250/636: Performed by: HOSPITALIST

## 2018-12-23 PROCEDURE — 74011000250 HC RX REV CODE- 250: Performed by: EMERGENCY MEDICINE

## 2018-12-23 PROCEDURE — 93005 ELECTROCARDIOGRAM TRACING: CPT

## 2018-12-23 PROCEDURE — 82803 BLOOD GASES ANY COMBINATION: CPT

## 2018-12-23 PROCEDURE — 93306 TTE W/DOPPLER COMPLETE: CPT

## 2018-12-23 PROCEDURE — 77030035694 HC MSK BIPAP FLL FAC PERFMAX PHIL -B

## 2018-12-23 PROCEDURE — 85025 COMPLETE CBC W/AUTO DIFF WBC: CPT

## 2018-12-23 PROCEDURE — 84484 ASSAY OF TROPONIN QUANT: CPT

## 2018-12-23 PROCEDURE — 74011250637 HC RX REV CODE- 250/637: Performed by: FAMILY MEDICINE

## 2018-12-23 PROCEDURE — 94660 CPAP INITIATION&MGMT: CPT

## 2018-12-23 PROCEDURE — 65660000001 HC RM ICU INTERMED STEPDOWN

## 2018-12-23 RX ORDER — METOPROLOL TARTRATE 50 MG/1
50 TABLET ORAL 2 TIMES DAILY
Status: DISCONTINUED | OUTPATIENT
Start: 2018-12-23 | End: 2018-12-24 | Stop reason: HOSPADM

## 2018-12-23 RX ORDER — ONDANSETRON 2 MG/ML
4 INJECTION INTRAMUSCULAR; INTRAVENOUS
Status: DISCONTINUED | OUTPATIENT
Start: 2018-12-23 | End: 2018-12-24 | Stop reason: HOSPADM

## 2018-12-23 RX ORDER — FAMOTIDINE 20 MG/1
20 TABLET, FILM COATED ORAL DAILY
Status: DISCONTINUED | OUTPATIENT
Start: 2018-12-23 | End: 2018-12-24 | Stop reason: HOSPADM

## 2018-12-23 RX ORDER — BUMETANIDE 0.25 MG/ML
1 INJECTION INTRAMUSCULAR; INTRAVENOUS DAILY
Status: DISCONTINUED | OUTPATIENT
Start: 2018-12-23 | End: 2018-12-23

## 2018-12-23 RX ORDER — LOSARTAN POTASSIUM 50 MG/1
50 TABLET ORAL DAILY
Status: DISCONTINUED | OUTPATIENT
Start: 2018-12-23 | End: 2018-12-24 | Stop reason: HOSPADM

## 2018-12-23 RX ORDER — SODIUM CHLORIDE 0.9 % (FLUSH) 0.9 %
5-10 SYRINGE (ML) INJECTION AS NEEDED
Status: DISCONTINUED | OUTPATIENT
Start: 2018-12-23 | End: 2018-12-24 | Stop reason: HOSPADM

## 2018-12-23 RX ORDER — NITROGLYCERIN 80 MG/1
1 PATCH TRANSDERMAL DAILY
Status: DISCONTINUED | OUTPATIENT
Start: 2018-12-23 | End: 2018-12-23

## 2018-12-23 RX ORDER — TRAMADOL HYDROCHLORIDE 50 MG/1
50 TABLET ORAL
Status: DISCONTINUED | OUTPATIENT
Start: 2018-12-23 | End: 2018-12-24 | Stop reason: HOSPADM

## 2018-12-23 RX ORDER — HYDRALAZINE HYDROCHLORIDE 20 MG/ML
10 INJECTION INTRAMUSCULAR; INTRAVENOUS
Status: DISCONTINUED | OUTPATIENT
Start: 2018-12-23 | End: 2018-12-24 | Stop reason: HOSPADM

## 2018-12-23 RX ORDER — HYDROCHLOROTHIAZIDE 25 MG/1
25 TABLET ORAL DAILY
Status: DISCONTINUED | OUTPATIENT
Start: 2018-12-23 | End: 2018-12-24 | Stop reason: HOSPADM

## 2018-12-23 RX ORDER — NITROGLYCERIN 0.4 MG/1
0.4 TABLET SUBLINGUAL
Status: COMPLETED | OUTPATIENT
Start: 2018-12-23 | End: 2018-12-23

## 2018-12-23 RX ORDER — ACETAMINOPHEN 325 MG/1
650 TABLET ORAL
Status: DISCONTINUED | OUTPATIENT
Start: 2018-12-23 | End: 2018-12-24 | Stop reason: HOSPADM

## 2018-12-23 RX ORDER — SODIUM CHLORIDE 0.9 % (FLUSH) 0.9 %
5-10 SYRINGE (ML) INJECTION EVERY 8 HOURS
Status: DISCONTINUED | OUTPATIENT
Start: 2018-12-23 | End: 2018-12-24 | Stop reason: HOSPADM

## 2018-12-23 RX ORDER — IPRATROPIUM BROMIDE AND ALBUTEROL SULFATE 2.5; .5 MG/3ML; MG/3ML
3 SOLUTION RESPIRATORY (INHALATION) ONCE
Status: COMPLETED | OUTPATIENT
Start: 2018-12-23 | End: 2018-12-23

## 2018-12-23 RX ORDER — BUMETANIDE 0.25 MG/ML
1 INJECTION INTRAMUSCULAR; INTRAVENOUS 2 TIMES DAILY
Status: DISCONTINUED | OUTPATIENT
Start: 2018-12-24 | End: 2018-12-24 | Stop reason: HOSPADM

## 2018-12-23 RX ORDER — AMLODIPINE BESYLATE 5 MG/1
10 TABLET ORAL DAILY
Status: DISCONTINUED | OUTPATIENT
Start: 2018-12-23 | End: 2018-12-24 | Stop reason: HOSPADM

## 2018-12-23 RX ORDER — ATORVASTATIN CALCIUM 40 MG/1
20 TABLET, FILM COATED ORAL DAILY
Status: DISCONTINUED | OUTPATIENT
Start: 2018-12-23 | End: 2018-12-24 | Stop reason: HOSPADM

## 2018-12-23 RX ADMIN — NICARDIPINE HYDROCHLORIDE 5 MG/HR: 2.5 INJECTION INTRAVENOUS at 16:52

## 2018-12-23 RX ADMIN — IPRATROPIUM BROMIDE AND ALBUTEROL SULFATE 3 ML: .5; 3 SOLUTION RESPIRATORY (INHALATION) at 04:42

## 2018-12-23 RX ADMIN — AMLODIPINE BESYLATE 10 MG: 5 TABLET ORAL at 10:44

## 2018-12-23 RX ADMIN — HYDROCHLOROTHIAZIDE 25 MG: 25 TABLET ORAL at 10:44

## 2018-12-23 RX ADMIN — METHYLPREDNISOLONE SODIUM SUCCINATE 125 MG: 125 INJECTION, POWDER, FOR SOLUTION INTRAMUSCULAR; INTRAVENOUS at 04:42

## 2018-12-23 RX ADMIN — BUMETANIDE 1 MG: 0.25 INJECTION INTRAMUSCULAR; INTRAVENOUS at 16:00

## 2018-12-23 RX ADMIN — HYDRALAZINE HYDROCHLORIDE 10 MG: 20 INJECTION INTRAMUSCULAR; INTRAVENOUS at 16:05

## 2018-12-23 RX ADMIN — ATORVASTATIN CALCIUM 20 MG: 40 TABLET, FILM COATED ORAL at 10:49

## 2018-12-23 RX ADMIN — LOSARTAN POTASSIUM 50 MG: 50 TABLET ORAL at 10:44

## 2018-12-23 RX ADMIN — NICARDIPINE HYDROCHLORIDE 7.5 MG/HR: 2.5 INJECTION INTRAVENOUS at 20:24

## 2018-12-23 RX ADMIN — FAMOTIDINE 20 MG: 20 TABLET ORAL at 10:44

## 2018-12-23 RX ADMIN — NITROGLYCERIN 1 INCH: 20 OINTMENT TOPICAL at 05:09

## 2018-12-23 RX ADMIN — METOPROLOL TARTRATE 50 MG: 50 TABLET ORAL at 10:43

## 2018-12-23 RX ADMIN — NITROGLYCERIN 0.4 MG: 0.4 TABLET, ORALLY DISINTEGRATING SUBLINGUAL at 04:42

## 2018-12-23 NOTE — PROGRESS NOTES
Patient seen by Martineist early this am:       Flash pulmonary edema    Will stop nitro, start home medications, bumex added IV daily. Resp to attempt to get off bipap. If can be stable off bipap for a couple hrs can go to tele floor.

## 2018-12-23 NOTE — ACP (ADVANCE CARE PLANNING)
Spoke with Patient at 1120 regarding AMD.  He requested we make Kia Landry, Pt's Uncle,  his primary agent/ NOK () and keep Christophe Dempsey the secondary contact. I provided Pt with the forms needed to complete the ACP upon admission.

## 2018-12-23 NOTE — LETTER
NOTIFICATION RETURN TO WORK / SCHOOL 
 
12/24/2018 6:43 AM 
 
Mr. Ruy Shaw [address] [facility] [address] [id number] To Whom It May Concern: 
 
Ruy Shaw was seen in the Emergency Department on December 23, 2018 and was released December 24, 2018. Any questions please contact our office. Thank you.  
 
 
 
Sincerely, 
 
 
 
 
Lizabeth Lugo RN

## 2018-12-23 NOTE — PROGRESS NOTES
met with Patient completed the initial Spiritual Assessment of the patient, and offered Pastoral Care, see flow sheets for interventions. Patient does not have any Confucianism/cultural needs that will affect patients preferences in health care. Charted reviewed. Chaplains will continue to follow and will provide pastoral care on an as needed/requested basis.       William, MPH,   [address]  [phone]

## 2018-12-23 NOTE — PROGRESS NOTES
Bipap check done. Weaning Process:    Placed patient on IPAP 12 EPAP6  Tidal volume 529, minute volume 10.9   Heartrate 110 RR 22, SAT 98%. Physican wants patient wean off Bipap. Patient is placed on 6LPM via nasal cannula. Heart rate 100 RR 20 , Oxygen Saturation. Will continue to monitor. 12/23/2018  1310:  RT called to ER. Patient appears in respiratory distress. RR 36  . Placed patient back on Bipap  previous  Settings. IPAP 12 EPAP  FIO2 40%. Will continue to monitor. 12/23/2018 1815 PM    PHYSICIAN wants to wean patient off Bipap. Placed patient on 6lpm via nasal cannula. SAT 99 RR 25. Will continue to monitor. Meli Gar

## 2018-12-23 NOTE — H&P
Internal Medicine History and Physical          Subjective     HPI: Franki Montelongo is a 44 y.o. male who presented to the ED with acute, moderate SOB with onset 2.5 hours PTA. Patient was seen at [facility] earlier this week for similar complaints, states he forgot to fill the prednisone Rx. CTA at [facility] showed interstitial pulmonary edema on the lungs. Does take his HTN medications. En route to ED, EMS administered duoneb and albuterol treatment. Patient reports he was feeling SOB all day, used a nebulizer and was able to go to sleep. He woke up from sleep SOB. No modifying or aggravating factors were reported. No other concerns or symptoms at this time. ED evaluation revealed respiratory distress. He was placed on BiPap and Nitro was provided with improvement of his breathing. ED visit from [facility] last week was reviewed. His CTA shows interstitial edema without PE. Patient appears to be in pulmonary edema, but her pro-BNP is only 224. Troponin is slightly elevated at 0.06, but suspect this is from demand and not related to ACS. No STEMI on EKG. No signs of infection or sepsis with a normal WBC, no fever and symptoms atypical for pneumonia. Patient has never had a formal diagnosis of CHF, but suspect with his uncontrolled HTN, he may have CHF.     Will admit for further evaluation and treatment            PMHx:  Past Medical History:   Diagnosis Date    Chronic pain     back    GERD (gastroesophageal reflux disease)     Hypertension     Kidney stone        PSurgHx:  Past Surgical History:   Procedure Laterality Date    COLONOSCOPY N/A 9/15/2017    COLONOSCOPY performed by Desiree Puga MD at [address] [place] [address] HX HEMORRHOIDECTOMY  11/02/2017    HX ORTHOPAEDIC      left ankle, hardware in place       SocialHx:  Social History     Socioeconomic History    Marital status: SINGLE     Spouse name: Not on file    Number of children: Not on file    Years of education: Not on file    Highest education level: Not on file   Social Needs    Financial resource strain: Not on file    Food insecurity - worry: Not on file    Food insecurity - inability: Not on file   [employer] needs - medical: Not on file   [employer] needs - non-medical: Not on file   Occupational History    Not on file   Tobacco Use    Smoking status: Current Every Day Smoker     Packs/day: 1.50     Types: Cigarettes    Smokeless tobacco: Never Used   Substance and Sexual Activity    Alcohol use: Yes     Comment: ocassionally    Drug use: No    Sexual activity: Yes     Partners: Female   Other Topics Concern    Not on file   Social History Narrative    Not on file       Allergies: Allergies   Allergen Reactions    Lisinopril Other (comments)     Sore throat       FamilyHx:  No family history on file. Prior to Admission Medications   Prescriptions Last Dose Informant Patient Reported? Taking? amLODIPine (NORVASC) 10 mg tablet   No No   Sig: Take 1 Tab by mouth daily. atorvastatin (LIPITOR) 20 mg tablet   No No   Sig: Take 1 Tab by mouth daily. hydroCHLOROthiazide (HYDRODIURIL) 25 mg tablet   No No   Sig: Take 1 Tab by mouth daily. losartan (COZAAR) 50 mg tablet   No No   Sig: TAKE ONE TABLET BY MOUTH TWICE DAILY   metoprolol tartrate (LOPRESSOR) 25 mg tablet   No No   Sig: TAKE ONE TABLET BY MOUTH TWICE DAILY   metoprolol tartrate (LOPRESSOR) 50 mg tablet   No No   Sig: Take 1 Tab by mouth two (2) times a day. mupirocin (BACTROBAN) 2 % ointment   No No   Sig: Apply  to affected area daily. traMADol (ULTRAM) 50 mg tablet   No No   Sig: Take 1 Tab by mouth every six (6) hours as needed for Pain. Max Daily Amount: 200 mg.       Facility-Administered Medications: None       Review of Systems:  CONST: fever(-),   chills(-),   fatigue(-),   malaise(-)  SKIN: itching(-),   rash(-)  EYES: vision - no change from baseline  ENT: ear pain(-),   nasal discharge(-),   sore throat(-),   voice change(-)  RESP: SOB(+),   cough(-), hemoptysis(-)  CV: chest pain(-),   PND(+),   Orthopnea(+),   exertional dyspnea(+),   palpitations(-), syncope(-)  GI: abd pain(-),   nausea(-),   vomiting(-),   diarrhea(-),   melena(-),   hematemesis(-), hematochesia(-)  : dysuria(-),   frequency(-),   urgency(-),   hematuria(-)  MS: arthralgias(-),   myalgias(-)  NEURO: speech w/o change,   tremors(-),   seizures(-),   numbness(-),   dizziness(-)    Objective      Visit Vitals  BP (!) 173/116   Pulse (!) 104   Temp 98.7 °F (37.1 °C)   Resp 25   Ht 5' 5\" (1.651 m)   Wt 83.9 kg (185 lb)   SpO2 100%   BMI 30.79 kg/m²       Physical Exam:  CONST: better respiratory pattern on BiPAP,   VSS reviewed  SKIN: rashes(-),   ulcers(-)  EYES: PERRL,   EOMI,   sclerae w/o jaundice  HENT: HEENT,   normal appearing nose and ears,   normal nasal mucosa and turbinates  NECK: supple,   no LA,   trachea is midline,   thyroid w/o goiter or palpable nodules  RESP: increased respiratory effort,   wheezes(-),   Rales(+) at bases,   Rhochi(+),   no consolidation on percussion  CHEST: normal axillae,   retractions(-),   use of accessory muscles(-)  CV: JVD(-),   carotid bruits(-),   RRR,   gallops(-),   murmurs(-),   edema LE(-),   abd bruits(-),   peripheral pulses normal  ABD: soft, tender(-),   distended(-),   HSM(-),   BS(+) in all quadrants,   peritoneal signs(-)  MS: NROM in all extremities,  clubbing(-),   peripheral cyanosis(-)  NEURO: speech normal, tremors(-),   CN II-XII(-),   lateralizing signs(-)  PSYCH: AOC x 3,   appropriate affect,   non-suicidal      Laboratory Studies: All lab results for the last 24 hours reviewed. Imaging Reviewed:  No results found.         Assessment/Plan     Active Hospital Problems    Diagnosis Date Noted    Acute pulmonary edema ([address] 12/23/2018     Possible SCAPE syndrome  Continue BiPAP  NTG patch and consider drip if necessary  Echo to evaluate for possible cardiomyopathy as heart seems enlarged on CXR         - Cont acceptable home medications for chronic conditions   - DVT protocol and GI prophylaxis    I have personally reviewed all pertinent labs, films and EKGs that have officially resulted. I reviewed available electronic documentation outlining the initial presentation as well as the emergency room physician's encounter. Total time spent with patient and chart review, orders and documentation - 45min out of which more than 50% spent face-to-face with patient.     Nat Fink MD  12/23/2018   6:25 AM      [facility]

## 2018-12-23 NOTE — ED NOTES
Patient awake and alert, asking for water and to use the phone. Respiratory at bedside for bipap trial off per Dr. Bello Place order.

## 2018-12-23 NOTE — PROGRESS NOTES
Called for Bipap. Patient in mild respiratory distress. Placed on Bipap 18/10 and 50%. Patient appears comfortable when I left bedside. RN at bedside administering breathing treatment.

## 2018-12-23 NOTE — ED NOTES
Patient standing at side of bed, SOB, sweating, tripoding and stated he can't breath, patient returned to BiPAP

## 2018-12-23 NOTE — ED TRIAGE NOTES
Pt brought in via EMS for c/o shortness of breath that woke him up out of his sleep. Given 2 duo nebs and 1 albuterol via EMS.  Pt tripoding, speaking in short sentences upon ED arrival.

## 2018-12-23 NOTE — PROGRESS NOTES
Note inability to get off of bipap. Will increase bumex, start cardene drip, will definitely need step down at this point. Echo is really not that bad , some mild pulm HTN.

## 2018-12-23 NOTE — ED NOTES
Patient updated on plan of care. Explained to patient the need for him to be in a stepdown bed vs. Medical bed. Patient concerned with not being able to eat or drink with bipap mask on.

## 2018-12-23 NOTE — ED NOTES
Spoke with Dr. Jeri Diamond about a diet order for patient. Dr. Jeri Diamond confirmed that no order would be placed until patient could come off the bipap.

## 2018-12-23 NOTE — ED NOTES
Nitro patch removed per Dr. Gildardo Ryan order. All patient's oral BP medications taken without difficulty.

## 2018-12-23 NOTE — ED NOTES
Pt seen at [address] [place] [address] 12/17/2018, given prescriptions but has not filled them. States he has failed to take any medications for \"a while\".

## 2018-12-23 NOTE — ED PROVIDER NOTES
Emergency Department Treatment Report    Patient: Dwayne Victor Age: 44 y.o. Sex: male    YOB: 1975 Admit Date: 12/23/2018 PCP: Rita Cordero MD   MRN: [id number]  CSN: [id number]     Room: [facility]/ Time Dictated: 4:31 AM      Chief Complaint   Chief Complaint   Patient presents with    Shortness of Breath       History of Present Illness   44 y.o. male, PMH HTN, childhood history of asthma, presents with acute, moderate SOB with onset 2.5 hours PTA. Patient was seen at [facility] earlier this week for similar complaints, states he forgot to fill the prednisone Rx. CTA at [facility] showed interstitial pulmonary edema on the lungs. Does take his HTN medications. En route to ED, EMS administered duoneb and albuterol treatment. Patient reports he was feeling SOB all day, used a nebulizer and was able to go to sleep. He woke up from sleep SOB. No modifying or aggravating factors were reported. No other concerns or symptoms at this time. Review of Systems   Constitutional: No fever, chills, or weight loss  Eyes: No visual symptoms. ENT: No sore throat, runny nose or ear pain. Respiratory: No cough or wheezing. Positive for SOB. Cardiovascular: No chest pain, pressure, palpitations, tightness or heaviness. Gastrointestinal: No vomiting, diarrhea or abdominal pain. Genitourinary: No dysuria, frequency, or urgency. Musculoskeletal: No joint pain or swelling. Integumentary: No rashes. Neurological: No headaches, sensory or motor symptoms. Denies complaints in all other systems.     Past Medical/Surgical History     Past Medical History:   Diagnosis Date    Chronic pain     back    GERD (gastroesophageal reflux disease)     Hypertension     Kidney stone      Past Surgical History:   Procedure Laterality Date    COLONOSCOPY N/A 9/15/2017    COLONOSCOPY performed by Luisa Olivares MD at [address] [place] [address] [facility]X [facility]  11/02/2017    HX ORTHOPAEDIC      left ankle, hardware in place Social History     Social History     Socioeconomic History    Marital status: SINGLE     Spouse name: Not on file    Number of children: Not on file    Years of education: Not on file    Highest education level: Not on file   Tobacco Use    Smoking status: Current Every Day Smoker     Packs/day: 1.50     Types: Cigarettes    Smokeless tobacco: Never Used   Substance and Sexual Activity    Alcohol use: Yes     Comment: ocassionally    Drug use: No    Sexual activity: Yes     Partners: Female       Family History   No family history on file. Home Medications     Cannot display prior to admission medications because the patient has not been admitted in this contact. Allergies     Allergies   Allergen Reactions    Lisinopril Other (comments)     Sore throat       Physical Exam     ED Triage Vitals   ED Encounter Vitals Group      BP       Pulse       Resp       Temp       Temp src       SpO2       Weight       Height      Constitutional: Patient appears well developed and well nourished. Appearance and behavior are age and situation appropriate. HEENT: Conjunctiva clear. PERRLA. Mucous membranes moist, non-erythematous. Surface of the pharynx, palate, and tongue are pink, moist and without lesions. Neck: supple, non tender, symmetrical, no masses or JVD. Respiratory: lungs clear to auscultation, nonlabored respirations. No tachypnea or accessory muscle use. Cardiovascular: heart regular rate and rhythm without murmur rubs or gallops. Calves soft and non-tender. Distal pulses 2+ and equal bilaterally. No peripheral edema. Gastrointestinal:  Abdomen soft, nontender without complaint of pain to palpation  Musculoskeletal: Nail beds pink with prompt capillary refill  Integumentary: warm and dry without rashes or lesions  Neurologic: alert and oriented, Sensation intact, motor strength equal and symmetric. No facial asymmetry or dysarthria.     Diagnostic Studies   Lab:   Recent Results (from the past 12 hour(s))   CBC WITH AUTOMATED DIFF    Collection Time: 12/23/18  4:47 AM   Result Value Ref Range    WBC 6.8 4.6 - 13.2 K/uL    RBC 4.56 (L) 4.70 - 5.50 M/uL    HGB 14.3 13.0 - 16.0 g/dL    HCT 42.0 36.0 - 48.0 %    MCV 92.1 74.0 - 97.0 FL    MCH 31.4 24.0 - 34.0 PG    MCHC 34.0 31.0 - 37.0 g/dL    RDW 14.2 11.6 - 14.5 %    PLATELET 627 926 - 115 K/uL    MPV 10.3 9.2 - 11.8 FL    NEUTROPHILS 44 40 - 73 %    LYMPHOCYTES 45 21 - 52 %    MONOCYTES 9 3 - 10 %    EOSINOPHILS 2 0 - 5 %    BASOPHILS 0 0 - 2 %    ABS. NEUTROPHILS 3.0 1.8 - 8.0 K/UL    ABS. LYMPHOCYTES 3.1 0.9 - 3.6 K/UL    ABS. MONOCYTES 0.6 0.05 - 1.2 K/UL    ABS. EOSINOPHILS 0.1 0.0 - 0.4 K/UL    ABS. BASOPHILS 0.0 0.0 - 0.1 K/UL    DF AUTOMATED     METABOLIC PANEL, COMPREHENSIVE    Collection Time: 12/23/18  4:47 AM   Result Value Ref Range    Sodium 144 136 - 145 mmol/L    Potassium 4.0 3.5 - 5.5 mmol/L    Chloride 111 (H) 100 - 108 mmol/L    CO2 24 21 - 32 mmol/L    Anion gap 9 3.0 - 18 mmol/L    Glucose 93 74 - 99 mg/dL    BUN 9 7.0 - 18 MG/DL    Creatinine 0.95 0.6 - 1.3 MG/DL    BUN/Creatinine ratio 9 (L) 12 - 20      GFR est AA >60 >60 ml/min/1.73m2    GFR est non-AA >60 >60 ml/min/1.73m2    Calcium 8.3 (L) 8.5 - 10.1 MG/DL    Bilirubin, total 0.5 0.2 - 1.0 MG/DL    ALT (SGPT) 74 (H) 16 - 61 U/L    AST (SGOT) 63 (H) 15 - 37 U/L    Alk.  phosphatase 107 45 - 117 U/L    Protein, total 7.1 6.4 - 8.2 g/dL    Albumin 3.6 3.4 - 5.0 g/dL    Globulin 3.5 2.0 - 4.0 g/dL    A-G Ratio 1.0 0.8 - 1.7     NT-PRO BNP    Collection Time: 12/23/18  4:47 AM   Result Value Ref Range    NT pro- 0 - 450 PG/ML   ETHYL ALCOHOL    Collection Time: 12/23/18  4:47 AM   Result Value Ref Range    ALCOHOL(ETHYL),SERUM 208 (H) 0 - 3 MG/DL   TROPONIN I    Collection Time: 12/23/18  4:47 AM   Result Value Ref Range    Troponin-I, QT 0.06 (H) 0.0 - 0.045 NG/ML   POC VENOUS BLOOD GAS    Collection Time: 12/23/18  4:56 AM   Result Value Ref Range    Device: BIPAP      FIO2 (POC) 50 %    pH, venous (POC) 7.324 7.32 - 7.42      pCO2, venous (POC) 49.8 41 - 51 MMHG    pO2, venous (POC) 25 25 - 40 mmHg    HCO3, venous (POC) 26.0 23.0 - 28.0 MMOL/L    sO2, venous (POC) 42 (L) 65 - 88 %    Base excess, venous (POC) 0 mmol/L    PEEP/CPAP (POC) 10 cmH2O    PIP (POC) 19      Pressure support 10 cmH2O    Allens test (POC) YES      Total resp. rate 29      Site RIGHT RADIAL      Patient temp. 97.80      Specimen type (POC) VENOUS BLOOD      Performed by Westley Stephenson    EKG, 12 LEAD, INITIAL    Collection Time: 12/23/18  5:02 AM   Result Value Ref Range    Ventricular Rate 101 BPM    Atrial Rate 101 BPM    P-R Interval 144 ms    QRS Duration 106 ms    Q-T Interval 418 ms    QTC Calculation (Bezet) 542 ms    Calculated P Axis 49 degrees    Calculated R Axis 67 degrees    Calculated T Axis 39 degrees    Diagnosis       Sinus tachycardia  Voltage criteria for left ventricular hypertrophy  Nonspecific T wave abnormality  Abnormal ECG  When compared with ECG of 02-NOV-2017 10:10,  Nonspecific T wave abnormality now evident in Lateral leads  QT has lengthened         Imaging:    No results found. [unfilled]    EKG at 5:02 AM sinus tachycardic at 101, LVH, QTc prolonged at 542. No STEMI. CXR (Prelim Read): Pulmonary edema  ED Course/Medical Decision Making   Patient seen immediately on arrival to the ER due to respiratory distress. He was placed on BiPap and Nitro was provided with improvement of his breathing. ED visit from [facility] last week was reviewed. His CTA shows interstitial edema without PE. Patient appears to be in pulmonary edema, but her pro-BNP is only 224. Troponin is slightly elevated at 0.06, but suspect this is from demand and not related to ACS. No STEMI on EKG. No signs of infection or sepsis with a normal WBC, no fever and symptoms atypical for pneumonia. Patient has never had a formal diagnosis of CHF, but suspect with his uncontrolled HTN, he may have CHF. Patient requires admission for further evaluation and treatment. Critical Care Time:  The services I provided to this patient were to treat and/or prevent clinically significant deterioration that could result in the failure of one or more body systems and/or organ systems due to acute pulmonary edema. Services included the following:  -reviewing nursing notes and old charts  -vital sign assessments  -direct patient care  -medication orders and management  -interpreting and reviewing diagnostic studies/labs  -re-evaluations  -documentation time    Aggregate critical care time was 34 minutes, which includes only time during which I was engaged in work directly related to the patient's care as described above, whether I was at bedside or elsewhere in the Emergency Department. It did not include time spent performing other reported procedures or the services of residents, students, nurses, or advance practice providers. Sidney Anderson MD  5:43 AM    Consult:  Discussed care with hospitalist Standard discussion; including history of patients chief complaint, available diagnostic results, and treatment course. Will admit. 5:37 AM, 12/23/2018       Medications   nitroglycerin (NITROSTAT) tablet 0.4 mg (0.4 mg SubLINGual Given 12/23/18 0442)   albuterol-ipratropium (DUO-NEB) 2.5 MG-0.5 MG/3 ML (3 mL Nebulization Given 12/23/18 0442)   methylPREDNISolone (PF) (Solu-MEDROL) injection 125 mg (125 mg IntraVENous Given 12/23/18 0442)   nitroglycerin (NITROBID) 2 % ointment 1 Inch (1 Inch Topical Given 12/23/18 0509)       Final Diagnosis       ICD-10-CM ICD-9-CM   1. SOB (shortness of breath) R06.02 786.05   2. Acute pulmonary edema (HCC) J81.0 518.4   3. Elevated troponin R74.8 790.6   4. Prolonged QT interval R94.31 794.31       Disposition   Patient is admitted.        My Medications      ASK your doctor about these medications      Instructions Each Dose to Equal Morning Noon Evening Bedtime   amLODIPine 10 mg tablet  Commonly known as:  NORVASC    Your last dose was: Your next dose is: Take 1 Tab by mouth daily. 10 mg                 atorvastatin 20 mg tablet  Commonly known as:  LIPITOR    Your last dose was: Your next dose is: Take 1 Tab by mouth daily. 20 mg                 hydroCHLOROthiazide 25 mg tablet  Commonly known as:  HYDRODIURIL    Your last dose was: Your next dose is: Take 1 Tab by mouth daily. 25 mg                 losartan 50 mg tablet  Commonly known as:  COZAAR    Your last dose was: Your next dose is:          TAKE ONE TABLET BY MOUTH TWICE DAILY                  * metoprolol tartrate 50 mg tablet  Commonly known as:  LOPRESSOR    Your last dose was: Your next dose is: Take 1 Tab by mouth two (2) times a day. 50 mg                 * metoprolol tartrate 25 mg tablet  Commonly known as:  LOPRESSOR    Your last dose was: Your next dose is:          TAKE ONE TABLET BY MOUTH TWICE DAILY                  mupirocin 2 % ointment  Commonly known as:  BACTROBAN    Your last dose was: Your next dose is:          Apply  to affected area daily. traMADol 50 mg tablet  Commonly known as:  ULTRAM    Your last dose was: Your next dose is: Take 1 Tab by mouth every six (6) hours as needed for Pain. Max Daily Amount: 200 mg.   50 mg                     * This list has 2 medication(s) that are the same as other medications prescribed for you. Read the directions carefully, and ask your doctor or other care provider to review them with you. Jill Solis MD  December 23, 2018    My signature above authenticates this document and my orders, the final    diagnosis (es), discharge prescription (s), and instructions in the Epic    record. If you have any questions please contact [phone].     Nursing notes have been reviewed by the physician/ advanced practice    Clinician.       Thao Suri Mcfarlane MD acting as a scribe for and in the presence of Michaela Serrano MD      December 23, 2018 at 5:49 AM       Provider Attestation:      I personally performed the services described in the documentation, reviewed the documentation, as recorded by the scribe in my presence, and it accurately and completely records my words and actions.  December 23, 2018 at 5:49 AM - Michaela Serrano MD

## 2018-12-24 ENCOUNTER — APPOINTMENT (OUTPATIENT)
Dept: GENERAL RADIOLOGY | Age: 43
DRG: 189 | End: 2018-12-24
Attending: FAMILY MEDICINE
Payer: SELF-PAY

## 2018-12-24 VITALS
TEMPERATURE: 98.7 F | RESPIRATION RATE: 19 BRPM | SYSTOLIC BLOOD PRESSURE: 144 MMHG | BODY MASS INDEX: 30.82 KG/M2 | DIASTOLIC BLOOD PRESSURE: 82 MMHG | HEIGHT: 65 IN | OXYGEN SATURATION: 97 % | HEART RATE: 101 BPM | WEIGHT: 185 LBS

## 2018-12-24 LAB
ALBUMIN SERPL-MCNC: 3.4 G/DL (ref 3.4–5)
ALBUMIN/GLOB SERPL: 1 {RATIO} (ref 0.8–1.7)
ALP SERPL-CCNC: 104 U/L (ref 45–117)
ALT SERPL-CCNC: 64 U/L (ref 16–61)
ANION GAP SERPL CALC-SCNC: 7 MMOL/L (ref 3–18)
AST SERPL-CCNC: 42 U/L (ref 15–37)
BILIRUB SERPL-MCNC: 1.1 MG/DL (ref 0.2–1)
BUN SERPL-MCNC: 21 MG/DL (ref 7–18)
BUN/CREAT SERPL: 19 (ref 12–20)
CALCIUM SERPL-MCNC: 8.6 MG/DL (ref 8.5–10.1)
CHLORIDE SERPL-SCNC: 105 MMOL/L (ref 100–108)
CO2 SERPL-SCNC: 29 MMOL/L (ref 21–32)
CREAT SERPL-MCNC: 1.09 MG/DL (ref 0.6–1.3)
ERYTHROCYTE [DISTWIDTH] IN BLOOD BY AUTOMATED COUNT: 14.2 % (ref 11.6–14.5)
GLOBULIN SER CALC-MCNC: 3.3 G/DL (ref 2–4)
GLUCOSE SERPL-MCNC: 96 MG/DL (ref 74–99)
HCT VFR BLD AUTO: 40.8 % (ref 36–48)
HGB BLD-MCNC: 13.6 G/DL (ref 13–16)
MCH RBC QN AUTO: 30.6 PG (ref 24–34)
MCHC RBC AUTO-ENTMCNC: 33.3 G/DL (ref 31–37)
MCV RBC AUTO: 91.7 FL (ref 74–97)
PLATELET # BLD AUTO: 223 K/UL (ref 135–420)
PMV BLD AUTO: 10.7 FL (ref 9.2–11.8)
POTASSIUM SERPL-SCNC: 3.6 MMOL/L (ref 3.5–5.5)
PROT SERPL-MCNC: 6.7 G/DL (ref 6.4–8.2)
RBC # BLD AUTO: 4.45 M/UL (ref 4.7–5.5)
SODIUM SERPL-SCNC: 141 MMOL/L (ref 136–145)
WBC # BLD AUTO: 6.3 K/UL (ref 4.6–13.2)

## 2018-12-24 PROCEDURE — 85027 COMPLETE CBC AUTOMATED: CPT

## 2018-12-24 PROCEDURE — 96366 THER/PROPH/DIAG IV INF ADDON: CPT

## 2018-12-24 PROCEDURE — 80053 COMPREHEN METABOLIC PANEL: CPT

## 2018-12-24 NOTE — ED NOTES
Spoke with Dr. John Tripp and made him aware of patient's decision to leave AMA. Requested by MD to have patient sign AMA form.

## 2018-12-24 NOTE — DISCHARGE SUMMARY
Internal Medicine Discharge Summary        Patient: Mel Knowles    YOB: 1975    Age:  44 y.o. 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  Continue BiPAP  NTG patch and consider drip if necessary  Echo to evaluate for possible cardiomyopathy as heart seems enlarged on CXR             Hypertensive emergency ICD-10-CM: I16.1  ICD-9-CM: 401.9  12/23/2018 - Present        Internal and external prolapsed hemorrhoids ICD-10-CM: K64.8  ICD-9-CM: 455.2, 455.5  10/16/2017 - Present        Rectal bleeding ICD-10-CM: K62.5  ICD-9-CM: 569.3  8/21/2017 - Present        History of kidney stones ICD-10-CM: Z87.442  ICD-9-CM: V13.01  8/14/2017 - Present        Essential hypertension ICD-10-CM: I10  ICD-9-CM: 401.9  8/14/2017 - Present        Inguinal hernia of left side without obstruction or gangrene ICD-10-CM: K40.90  ICD-9-CM: 550.90  6/1/2017 - Present              Discharge Condition:  Left AMA    Procedures: None         Hospital Course: Mel Knowlse is a 44 y.o. male who presented to the ED with acute, moderate SOB with onset 2.5 hours PTA. Patient was seen at [facility] earlier this week for similar complaints, states he forgot to fill the prednisone Rx. CTA at [facility] showed interstitial pulmonary edema on the lungs. Does take his HTN medications. En route to ED, EMS administered duoneb and albuterol treatment. Patient reports he was feeling SOB all day, used a nebulizer and was able to go to sleep. He woke up from sleep SOB.  No modifying or aggravating factors were reported. No other concerns or symptoms at this time. ED evaluation revealed respiratory distress. He was placed on BiPap and Nitro was provided with improvement of his breathing. ED visit from [facility] last week was reviewed. His CTA shows interstitial edema without PE. Patient appears to be in pulmonary edema, but her pro-BNP is only 224. Troponin is slightly elevated at 0.06, but suspect this is from demand and not related to ACS. No STEMI on EKG. No signs of infection or sepsis with a normal WBC, no fever and symptoms atypical for pneumonia. Patient has never had a formal diagnosis of CHF, but suspect with his uncontrolled HTN, he may have CHF. The patient was placed on a cardene drip and BP monitored closely. Unfortunately, the patient decided to leave AMA before being appropriately worked up. Visit Vitals  /82   Pulse (!) 101   Temp 98.7 °F (37.1 °C)   Resp 19   Ht 5' 5\" (1.651 m)   Wt 83.9 kg (185 lb)   SpO2 97%   BMI 30.79 kg/m²       Labs Prior to Discharge:  Labs: Results:       Chemistry Recent Labs     12/24/18  0400 12/23/18  0447   GLU 96 93    144   K 3.6 4.0    111*   CO2 29 24   BUN 21* 9   CREA 1.09 0.95   CA 8.6 8.3*   AGAP 7 9   BUCR 19 9*    107   TP 6.7 7.1   ALB 3.4 3.6   GLOB 3.3 3.5   AGRAT 1.0 1.0      CBC w/Diff Recent Labs     12/24/18  0458 12/23/18  0447   WBC 6.3 6.8   RBC 4.45* 4.56*   HGB 13.6 14.3   HCT 40.8 42.0    231   GRANS  --  44   LYMPH  --  45   EOS  --  2      Cardiac Enzymes No results for input(s): CPK, CKND1, MARTHA in the last 72 hours. No lab exists for component: CKRMB, TROIP   Coagulation No results for input(s): PTP, INR, APTT in the last 72 hours.     No lab exists for component: INREXT    Lipid Panel Lab Results   Component Value Date/Time    Cholesterol, total 247 (H) 11/15/2017 02:44 PM    HDL Cholesterol 34 (L) 11/15/2017 02:44 PM    LDL, calculated 168 (H) 11/15/2017 02:44 PM    VLDL, calculated 45 (H) 11/15/2017 02:44 PM    Triglyceride 224 (H) 11/15/2017 02:44 PM      BNP No results for input(s): BNPP in the last 72 hours. Liver Enzymes Recent Labs     12/24/18  0400   TP 6.7   ALB 3.4      SGOT 42*      Thyroid Studies No results found for: T4, T3U, TSH, TSHEXT         Significant Imaging:  Xr Chest Port    Result Date: 12/23/2018  EXAM: Portable frontal view of the chest. CLINICAL INDICATION/HISTORY: Shortness of breath COMPARISON: None _______________ FINDINGS: The heart is enlarged. Vascular and interstitial congestion identified. No focal consolidation, effusion, or pneumothorax. _______________     IMPRESSION: 1. Cardiomegaly with vascular and interstitial congestion.            Patient left AMA         Total time spent including time spent on discharge documentation and records reviewed: < 30 minutes    Claudetta Chinchilla, DO  Internal Medicine, Hospitalist  Pager: [phone]

## 2018-12-24 NOTE — ED NOTES
Patient taken to restroom via wheelchair, had bowel movement. No respiratory distress noted. Patient requesting something to eat and given patient TV dinner. Patient tolerating well.

## 2018-12-24 NOTE — ED NOTES
Patient stating he is leaving, and demands all treatment be stopped. This nurse spoke with hospitalist, and he recommended stopping cardene drip and monitoring for stability, otherwise patient would have to leave AMA. Patient agrees to wait a little while longer for discharge.  Cardene stopped per patient request.

## 2018-12-24 NOTE — ED NOTES
Patient refusing chest xray. Explained to patient the risks of him leaving and patient states that he understands but states that he needs to leave before his ride goes to work.

## 2019-02-25 ENCOUNTER — OFFICE VISIT (OUTPATIENT)
Dept: FAMILY MEDICINE CLINIC | Age: 44
End: 2019-02-25

## 2019-02-25 ENCOUNTER — HOSPITAL ENCOUNTER (OUTPATIENT)
Dept: GENERAL RADIOLOGY | Age: 44
Discharge: HOME | End: 2019-02-25
Payer: COMMERCIAL

## 2019-02-25 VITALS
OXYGEN SATURATION: 97 % | WEIGHT: 202 LBS | TEMPERATURE: 97.7 F | BODY MASS INDEX: 33.66 KG/M2 | HEART RATE: 96 BPM | DIASTOLIC BLOOD PRESSURE: 124 MMHG | RESPIRATION RATE: 18 BRPM | SYSTOLIC BLOOD PRESSURE: 170 MMHG | HEIGHT: 65 IN

## 2019-02-25 DIAGNOSIS — E04.1 THYROID NODULE: ICD-10-CM

## 2019-02-25 DIAGNOSIS — I10 ESSENTIAL HYPERTENSION: ICD-10-CM

## 2019-02-25 DIAGNOSIS — R06.02 SHORTNESS OF BREATH: ICD-10-CM

## 2019-02-25 DIAGNOSIS — F17.210 CIGARETTE SMOKER: ICD-10-CM

## 2019-02-25 DIAGNOSIS — R06.09 DYSPNEA ON EXERTION: ICD-10-CM

## 2019-02-25 DIAGNOSIS — E78.00 HYPERCHOLESTEREMIA: ICD-10-CM

## 2019-02-25 DIAGNOSIS — I10 UNCONTROLLED HYPERTENSION: ICD-10-CM

## 2019-02-25 DIAGNOSIS — R06.02 SHORTNESS OF BREATH: Primary | ICD-10-CM

## 2019-02-25 PROCEDURE — 71046 X-RAY EXAM CHEST 2 VIEWS: CPT

## 2019-02-25 RX ORDER — ALBUTEROL SULFATE 0.83 MG/ML
2.5 SOLUTION RESPIRATORY (INHALATION) ONCE
Qty: 1 EACH | Refills: 0
Start: 2019-02-25 | End: 2019-02-25

## 2019-02-25 RX ORDER — ALBUTEROL SULFATE 90 UG/1
1 AEROSOL, METERED RESPIRATORY (INHALATION)
Qty: 1 INHALER | Refills: 3 | Status: SHIPPED | OUTPATIENT
Start: 2019-02-25

## 2019-02-25 RX ORDER — PREDNISONE 5 MG/1
TABLET ORAL
Qty: 21 TAB | Refills: 0 | Status: SHIPPED | OUTPATIENT
Start: 2019-02-25 | End: 2019-03-07 | Stop reason: ALTCHOICE

## 2019-02-25 RX ORDER — HYDROCHLOROTHIAZIDE 25 MG/1
25 TABLET ORAL DAILY
Qty: 30 TAB | Refills: 0 | Status: SHIPPED | OUTPATIENT
Start: 2019-02-25 | End: 2019-03-27 | Stop reason: SDUPTHER

## 2019-02-25 RX ORDER — LOSARTAN POTASSIUM 50 MG/1
50 TABLET ORAL 2 TIMES DAILY
Qty: 60 TAB | Refills: 0 | Status: SHIPPED | OUTPATIENT
Start: 2019-02-25 | End: 2019-03-27 | Stop reason: SDUPTHER

## 2019-02-25 RX ORDER — IPRATROPIUM BROMIDE AND ALBUTEROL SULFATE 2.5; .5 MG/3ML; MG/3ML
3 SOLUTION RESPIRATORY (INHALATION)
Qty: 3 ML | Refills: 0
Start: 2019-02-25 | End: 2019-02-25

## 2019-02-25 RX ORDER — AMLODIPINE BESYLATE 10 MG/1
10 TABLET ORAL DAILY
Qty: 30 TAB | Refills: 0 | Status: SHIPPED | OUTPATIENT
Start: 2019-02-25 | End: 2019-03-27 | Stop reason: SDUPTHER

## 2019-02-25 NOTE — PATIENT INSTRUCTIONS
Pulmonary Edema: Care Instructions  Your Care Instructions    Pulmonary edema is the buildup of fluid in the lungs. It usually occurs when the heart does not pump blood through the body properly. Pulmonary edema can also be caused by another disease, such as liver or kidney failure. It can also happen at high altitudes, from a poisoning, or as a result of a near-drowning. If you have fluid in your lungs, you may have trouble breathing, be restless, have a fast heart rate, or cough up foamy pink fluid. Breathing problems may be worse when you lie down. Follow-up care is a key part of your treatment and safety. Be sure to make and go to all appointments, and call your doctor if you are having problems. It's also a good idea to know your test results and keep a list of the medicines you take. How can you care for yourself at home? Medicines    · Take your medicines exactly as prescribed. Call your doctor if you think you are having a problem with your medicine.     · Review all of your regular medicines with your doctor. Do not take any vitamins, over-the-counter medicines, or herbal products without talking to your doctor first.   Diet    · Eat a balanced diet. Make an appointment with a dietitian if you have questions about what type of diet might be best for you.     · Do not eat more than 2,000 milligrams (mg) of sodium each day. That is less than 1 teaspoon of salt a day, including all the salt you eat in prepared or packaged foods. ? Do not add salt while you are cooking or at the table. Flavor with garlic, lemon juice, onion, vinegar, herbs, and spices instead of salt. ? Eat fewer processed foods and foods from restaurants, including fast food. ? Use fresh or frozen foods instead of canned. ? Count and record how much sodium you eat each day. Check food labels for sodium. ?  Ask your doctor before using salt substitutes that have potassium, such as Lite Salt.    Lifestyle    · Stay out of air pollution; smog; cold, dry air; hot, humid air; and high altitudes.     · Learn breathing methods that help the airflow in and out of your lungs.     · Take rest breaks often. Schedule short rest breaks when doing housework and other activities. An occupational or physical therapist can help you find ways to do everyday activities with less effort.     · Start light exercise if your doctor says it is okay. Try to stay as active as possible. If you have not exercised in the past, start out slowly. Walking is a good way to start.     · Get enough rest at night. Sleeping with 1 or 2 pillows under your upper body and head may help you breathe easier at night.     · Discuss rehabilitation with your doctor. Find out what programs are available in your area.     · Do not smoke or use other tobacco products. Smoking can make your condition worse. If you need help quitting, talk to your doctor about stop-smoking programs and medicines. These can increase your chances of quitting for good.     · Do not use alcohol or illegal drugs. When should you call for help? Call 911 anytime you think you may need emergency care. For example, call if:    · You have severe trouble breathing.     · You passed out (lost consciousness).     · You have symptoms of a heart attack. These may include:  ? Chest pain or pressure, or a strange feeling in the chest.  ? Sweating. ? Shortness of breath. ? Nausea or vomiting. ? Pain, pressure, or a strange feeling in the back, neck, jaw, or upper belly or in one or both shoulders or arms. ? Lightheadedness or sudden weakness. ? A fast or irregular heartbeat. Pain that spreads from the chest to the neck, jaw, or one or both shoulders or arms.    After you call 911, the  may tell you to chew 1 adult-strength or 2 to 4 low-dose aspirin. Wait for an ambulance.  Do not try to drive yourself.   Call your doctor now or seek immediate medical care if:    · You have trouble breathing or have wheezing that is getting worse.     · You are coughing more deeply or more often.     · You cough up blood.     · You get a fever.     · You have more swelling in your legs or belly.     · Your symptoms are getting worse.    Watch closely for changes in your health, and be sure to contact your doctor if you have any problems. Where can you learn more? Go to http://nasra-ashleigh.info/. Enter L847 in the search box to learn more about \"Pulmonary Edema: Care Instructions. \"  Current as of: September 5, 2018  Content Version: 11.9  © 4543-8135 [employer]. Care instructions adapted under license by [employer] (which disclaims liability or warranty for this information). If you have questions about a medical condition or this instruction, always ask your healthcare professional. [address] any warranty or liability for your use of this information. High Blood Pressure: Care Instructions  Overview    It's normal for blood pressure to go up and down throughout the day. But if it stays up, you have high blood pressure. Another name for high blood pressure is hypertension. Despite what a lot of people think, high blood pressure usually doesn't cause headaches or make you feel dizzy or lightheaded. It usually has no symptoms. But it does increase your risk of stroke, heart attack, and other problems. You and your doctor will talk about your risks of these problems based on your blood pressure. Your doctor will give you a goal for your blood pressure. Your goal will be based on your health and your age. Lifestyle changes, such as eating healthy and being active, are always important to help lower blood pressure. You might also take medicine to reach your blood pressure goal.  Follow-up care is a key part of your treatment and safety. Be sure to make and go to all appointments, and call your doctor if you are having problems.  It's also a good idea to know your test results and keep a list of the medicines you take. How can you care for yourself at home? Medical treatment  · If you stop taking your medicine, your blood pressure will go back up. You may take one or more types of medicine to lower your blood pressure. Be safe with medicines. Take your medicine exactly as prescribed. Call your doctor if you think you are having a problem with your medicine. · Talk to your doctor before you start taking aspirin every day. Aspirin can help certain people lower their risk of a heart attack or stroke. But taking aspirin isn't right for everyone, because it can cause serious bleeding. · See your doctor regularly. You may need to see the doctor more often at first or until your blood pressure comes down. · If you are taking blood pressure medicine, talk to your doctor before you take decongestants or anti-inflammatory medicine, such as ibuprofen. Some of these medicines can raise blood pressure. · Learn how to check your blood pressure at home. Lifestyle changes  · Stay at a healthy weight. This is especially important if you put on weight around the waist. Losing even 10 pounds can help you lower your blood pressure. · If your doctor recommends it, get more exercise. Walking is a good choice. Bit by bit, increase the amount you walk every day. Try for at least 30 minutes on most days of the week. You also may want to swim, bike, or do other activities. · Avoid or limit alcohol. Talk to your doctor about whether you can drink any alcohol. · Try to limit how much sodium you eat to less than 2,300 milligrams (mg) a day. Your doctor may ask you to try to eat less than 1,500 mg a day. · Eat plenty of fruits (such as bananas and oranges), vegetables, legumes, whole grains, and low-fat dairy products. · Lower the amount of saturated fat in your diet. Saturated fat is found in animal products such as milk, cheese, and meat.  Limiting these foods may help you lose weight and also lower your risk for heart disease. · Do not smoke. Smoking increases your risk for heart attack and stroke. If you need help quitting, talk to your doctor about stop-smoking programs and medicines. These can increase your chances of quitting for good. When should you call for help? Call 911 anytime you think you may need emergency care. This may mean having symptoms that suggest that your blood pressure is causing a serious heart or blood vessel problem. Your blood pressure may be over 180/120.   For example, call 911 if:    · You have symptoms of a heart attack. These may include:  ? Chest pain or pressure, or a strange feeling in the chest.  ? Sweating. ? Shortness of breath. ? Nausea or vomiting. ? Pain, pressure, or a strange feeling in the back, neck, jaw, or upper belly or in one or both shoulders or arms. ? Lightheadedness or sudden weakness. ? A fast or irregular heartbeat.     · You have symptoms of a stroke. These may include:  ? Sudden numbness, tingling, weakness, or loss of movement in your face, arm, or leg, especially on only one side of your body. ? Sudden vision changes. ? Sudden trouble speaking. ? Sudden confusion or trouble understanding simple statements. ? Sudden problems with walking or balance. ? A sudden, severe headache that is different from past headaches.     · You have severe back or belly pain.    Do not wait until your blood pressure comes down on its own. Get help right away.   Call your doctor now or seek immediate care if:    · Your blood pressure is much higher than normal (such as 180/120 or higher), but you don't have symptoms.     · You think high blood pressure is causing symptoms, such as:  ? Severe headache.  ? Blurry vision.    Watch closely for changes in your health, and be sure to contact your doctor if:    · Your blood pressure measures higher than your doctor recommends at least 2 times.  That means the top number is higher or the bottom number is higher, or both.     · You think you may be having side effects from your blood pressure medicine. Where can you learn more? Go to http://nasra-ashleigh.info/. Enter Y600 in the search box to learn more about \"High Blood Pressure: Care Instructions. \"  Current as of: July 22, 2018  Content Version: 11.9  © 0701-8861 [employer]. Care instructions adapted under license by [employer] (which disclaims liability or warranty for this information). If you have questions about a medical condition or this instruction, always ask your healthcare professional. [place] [address] any warranty or liability for your use of this information.

## 2019-02-25 NOTE — LETTER
NOTIFICATION RETURN TO WORK 
 
2/25/2019 10:14 AM 
 
Mr. Lance Taylor [address] [facility] [address] [id number] To Whom It May Concern: 
 
Lance Taylor is currently under the care of [address] [place] [address]. He will return to work on: Tuesday, February 26, 2018 If there are questions or concerns please have the patient contact our office.  
 
 
 
Sincerely, 
 
 
Estrellita Garsia NP

## 2019-02-25 NOTE — PROGRESS NOTES
Samantha Yuan is a 44 y.o.  male and presents with    Chief Complaint   Patient presents with    Cough    Hypertension       Subjective:  Mr. Edda Clark presents today for routine care and to re-establish care. He has not been seen in the office since 11/2017. He was seen at [facility] ED on 12/23/18 with complaints of shortness of breath and sputum production. He was being worked up in the ED but was unable to wait and left AMA. He was seen at [facility] on 12/17 for the same symptoms. He was discharged with medication but states he did not fill them because he was unsure of what medications were prescribed and why. Today, he denies fever and chills. He reports sputum production and difficulty laying flat. He has shortness of breath when walking long distances. He reports intermittent wheezing. He denies chest pain and pain in on inspiration. He is currently sleeping in a recliner. Symptoms have been present since December. He has not taken any blood pressure medication in over a year. He states he was home in [place], [place] for a year and did not have primary care. Cardiovascular Review:  The patient has hypertension. Diet and Lifestyle: smoker 1 PPD  Home BP Monitoring: is not measured at home. Pertinent ROS: not taking medications regularly as instructed, noting some chest pains described as pressure when coughing, notes stable dyspnea on exertion, no change. Additional Concerns: Mr. Edda Clark is here to establish care.        Patient Active Problem List   Diagnosis Code    Inguinal hernia of left side without obstruction or gangrene K40.90    History of kidney stones Z87.442    Essential hypertension I10    Rectal bleeding K62.5    Internal and external prolapsed hemorrhoids K64.8    Acute pulmonary edema (HCC) J81.0    Hypertensive emergency I16.1     Current Outpatient Medications   Medication Sig Dispense Refill    losartan (COZAAR) 50 mg tablet TAKE ONE TABLET BY MOUTH TWICE DAILY 60 Tab 1    metoprolol tartrate (LOPRESSOR) 25 mg tablet TAKE ONE TABLET BY MOUTH TWICE DAILY 60 Tab 1    atorvastatin (LIPITOR) 20 mg tablet Take 1 Tab by mouth daily. 30 Tab 3    mupirocin (BACTROBAN) 2 % ointment Apply  to affected area daily. 22 g 3    amLODIPine (NORVASC) 10 mg tablet Take 1 Tab by mouth daily. 30 Tab 3    traMADol (ULTRAM) 50 mg tablet Take 1 Tab by mouth every six (6) hours as needed for Pain. Max Daily Amount: 200 mg. 20 Tab 0    metoprolol tartrate (LOPRESSOR) 50 mg tablet Take 1 Tab by mouth two (2) times a day. 60 Tab 3    hydroCHLOROthiazide (HYDRODIURIL) 25 mg tablet Take 1 Tab by mouth daily. 30 Tab 3     Allergies   Allergen Reactions    Lisinopril Other (comments)     Sore throat     Past Medical History:   Diagnosis Date    Chronic pain     back    GERD (gastroesophageal reflux disease)     Hypertension     Kidney stone      Past Surgical History:   Procedure Laterality Date    COLONOSCOPY N/A 9/15/2017    COLON[facility] Neno Kirk [facility] at [facility] ENDOSCOPY    HX GI      hemorrhoidectomy    HX HEMORRHOIDECTOMY  11/02/2017    HX ORTHOPAEDIC      left ankle, hardware in place     History reviewed. No pertinent family history.   Social History     Tobacco Use    Smoking status: Current Every Day Smoker     Packs/day: 1.00     Years: 28.00     Pack years: 28.00     Types: Cigarettes    Smokeless tobacco: Current User   Substance Use Topics    Alcohol use: Yes     Comment: ocassionally       ROS   History obtained from the patient  General ROS: negative for - chills or fever  Respiratory ROS: positive for - cough, shortness of breath and wheezing  Cardiovascular ROS: positive for - dyspnea on exertion  Gastrointestinal ROS: no abdominal pain, change in bowel habits, or black or bloody stools  Genito-Urinary ROS: no dysuria, trouble voiding, or hematuria  Dermatological ROS: negative for - rash    All other systems reviewed and are negative. Objective:  Vitals:    02/25/19 0908 02/25/19 0916   BP: (!) 167/122 (!) 170/124   Pulse: 96    Resp: 18    Temp: 97.7 °F (36.5 °C)    TempSrc: Oral    SpO2: 97%    Weight: 202 lb (91.6 kg)    Height: 5' 5\" (1.651 m)    PainSc:   0 - No pain        PE  General appearance - alert, well appearing, and in no distress and overweight  Mental status - normal mood, behavior, speech, dress, motor activity, and thought processes  Neck - supple, no significant adenopathy, thyroid exam: thyroid is normal in size without nodules or tenderness  Chest - decreased air entry noted throughout all lung fields; after in office duo neb noted air exchange and wheezing to left upper lung  Heart - normal rate and regular rhythm  Abdomen - soft, nontender, nondistended, no masses or organomegaly  Extremities - peripheral pulses normal, no pedal edema, no clubbing or cyanosis  Skin - normal coloration and turgor, no rashes, no suspicious skin lesions noted      LABS   Lab Results   Component Value Date/Time    WBC 6.3 12/24/2018 04:58 AM    HGB 13.6 12/24/2018 04:58 AM    HCT 40.8 12/24/2018 04:58 AM    PLATELET 980 40/69/5612 04:58 AM    MCV 91.7 12/24/2018 04:58 AM     Lab Results   Component Value Date/Time    Troponin-I, QT 0.03 12/23/2018 04:40 PM      Lab Results   Component Value Date/Time    Sodium 141 12/24/2018 04:00 AM    Potassium 3.6 12/24/2018 04:00 AM    Chloride 105 12/24/2018 04:00 AM    CO2 29 12/24/2018 04:00 AM    Anion gap 7 12/24/2018 04:00 AM    Glucose 96 12/24/2018 04:00 AM    BUN 21 (H) 12/24/2018 04:00 AM    Creatinine 1.09 12/24/2018 04:00 AM    BUN/Creatinine ratio 19 12/24/2018 04:00 AM    GFR est AA >60 12/24/2018 04:00 AM    GFR est non-AA >60 12/24/2018 04:00 AM    Calcium 8.6 12/24/2018 04:00 AM    Bilirubin, total 1.1 (H) 12/24/2018 04:00 AM    ALT (SGPT) 64 (H) 12/24/2018 04:00 AM    AST (SGOT) 42 (H) 12/24/2018 04:00 AM    Alk.  phosphatase 104 12/24/2018 04:00 AM    Protein, total 6.7 12/24/2018 04:00 AM    Albumin 3.4 12/24/2018 04:00 AM    Globulin 3.3 12/24/2018 04:00 AM    A-G Ratio 1.0 12/24/2018 04:00 AM        TESTS  Chest Xray 12/23/18: IMPRESSION:     1. Cardiomegaly with vascular and interstitial congestion. EKG 12/23/18:   Diagnosis   Final   Sinus tachycardia   Voltage criteria for left ventricular hypertrophy   Nonspecific T wave abnormality   Abnormal ECG   When compared with ECG of 02-NOV-2017 10:10,   Nonspecific T wave abnormality now evident in Lateral leads   QT has lengthened   Confirmed by John Blair MD, --- ([id number]) on 12/23/2018 10:02:44 AM          CT CTA CHEST [id number]/17/2018  Merged with [facility]  Result Impression   Thyroid nodules; unless previously evaluated elsewhere thyroid ultrasound should be electively obtained  Interstitial pulmonary edema. Negative for pulmonary embolism or other acute vascular pathology  Fatty infiltration of the liver    Thank you for enabling us to participate in the care of this patient. Signed By: Phil Daly MD on 12/17/2018 1:33 PM       Assessment/Plan:    1. SOB/RODRIGUEZ- since 12/2018; in office duo neb (patient reports improvement in breathing); has not followed up with PCP; imaging from 12/2018 showed pulmonary edema- may be a results of uncontrolled HTN; repeat Chest xray today; prednisone taper; albuterol for SOB and wheezing; return to office in 3 days for follow up symptoms    2. Uncontrolled HTN- has not been on medication > 1 year; may be precipitating current symptoms; refill on Losartan, Amlodipine, and HCTZ; return to office in 3 days for BP check    3. Thyroid Nodule- incidentally found on CTA chest 12/2018; thyroid US for further evaluation; TSH and free T4    4. Tobacco Abuse- The patient was counseled on the dangers of tobacco use, and was advised to quit.   Reviewed strategies to maximize success, including removing cigarettes and smoking materials from environment, stress management and total time spent in discussion: 4 minutes. 5. Hypercholesterolemia- lipid panel today    Lab review: orders written for new lab studies as appropriate; see orders      Today's Visit: Chest Xray, In office Duo Neb, CBC, CMP, TSH and Free T4, Troponin, pro-BNP, Lipid Panel, Referral to Cardio, Referral to Pulmonary, Albuterol, Losartan, Amlodipine, HCTZ, Prednisone taper, Thyroid US    Health Maintenance: Will discuss at next office visit    I have discussed the diagnosis with the patient and the intended plan as seen in the above orders. The patient has received an after-visit summary and questions were answered concerning future plans. I have discussed medication side effects and warnings with the patient as well. I have reviewed the plan of care with the patient, accepted their input and they are in agreement with the treatment goals. Follow-up Disposition:  Return in about 3 days (around 2/28/2019) for follow up SOB, HTN. More than 1/2 of this 25 minute visit was spent in counseling and coordination of care, as described above.     Eric Rod, MARQUIS-C

## 2019-02-25 NOTE — PROGRESS NOTES
Peggy Sampson is a 44 y.o. male  Chief Complaint   Patient presents with    Cough    Hypertension     1. Have you been to the ER, urgent care clinic since your last visit? Hospitalized since your last visit? No    2. Have you seen or consulted any other health care providers outside of the [address] [place] [address] since your last visit? Include any pap smears or colon screening.  No

## 2019-02-26 LAB
ALBUMIN SERPL-MCNC: 4.2 G/DL (ref 3.5–5.5)
ALBUMIN/GLOB SERPL: 1.7 {RATIO} (ref 1.2–2.2)
ALP SERPL-CCNC: 81 IU/L (ref 39–117)
ALT SERPL-CCNC: 32 IU/L (ref 0–44)
AST SERPL-CCNC: 30 IU/L (ref 0–40)
BILIRUB SERPL-MCNC: 0.4 MG/DL (ref 0–1.2)
BUN SERPL-MCNC: 9 MG/DL (ref 6–24)
BUN/CREAT SERPL: 8 (ref 9–20)
CALCIUM SERPL-MCNC: 9.1 MG/DL (ref 8.7–10.2)
CHLORIDE SERPL-SCNC: 107 MMOL/L (ref 96–106)
CHOLEST SERPL-MCNC: 226 MG/DL (ref 100–199)
CO2 SERPL-SCNC: 20 MMOL/L (ref 20–29)
CREAT SERPL-MCNC: 1.06 MG/DL (ref 0.76–1.27)
ERYTHROCYTE [DISTWIDTH] IN BLOOD BY AUTOMATED COUNT: 13.9 % (ref 12.3–15.4)
GLOBULIN SER CALC-MCNC: 2.5 G/DL (ref 1.5–4.5)
GLUCOSE SERPL-MCNC: 102 MG/DL (ref 65–99)
HCT VFR BLD AUTO: 43.9 % (ref 37.5–51)
HDLC SERPL-MCNC: 40 MG/DL
HGB BLD-MCNC: 15.1 G/DL (ref 13–17.7)
INTERPRETATION, 910389: NORMAL
LDLC SERPL CALC-MCNC: 160 MG/DL (ref 0–99)
MCH RBC QN AUTO: 31.5 PG (ref 26.6–33)
MCHC RBC AUTO-ENTMCNC: 34.4 G/DL (ref 31.5–35.7)
MCV RBC AUTO: 92 FL (ref 79–97)
NT-PROBNP SERPL-MCNC: 336 PG/ML (ref 0–86)
PLATELET # BLD AUTO: 245 X10E3/UL (ref 150–379)
POTASSIUM SERPL-SCNC: 3.9 MMOL/L (ref 3.5–5.2)
PROT SERPL-MCNC: 6.7 G/DL (ref 6–8.5)
RBC # BLD AUTO: 4.79 X10E6/UL (ref 4.14–5.8)
SODIUM SERPL-SCNC: 147 MMOL/L (ref 134–144)
T4 FREE SERPL-MCNC: 1.35 NG/DL (ref 0.82–1.77)
TRIGL SERPL-MCNC: 128 MG/DL (ref 0–149)
TROPONIN I SERPL-MCNC: 0.02 NG/ML (ref 0–0.04)
TSH SERPL DL<=0.005 MIU/L-ACNC: 0.47 UIU/ML (ref 0.45–4.5)
VLDLC SERPL CALC-MCNC: 26 MG/DL (ref 5–40)
WBC # BLD AUTO: 5.7 X10E3/UL (ref 3.4–10.8)

## 2019-02-28 ENCOUNTER — OFFICE VISIT (OUTPATIENT)
Dept: FAMILY MEDICINE CLINIC | Age: 44
End: 2019-02-28

## 2019-02-28 VITALS
TEMPERATURE: 95.9 F | HEIGHT: 65 IN | OXYGEN SATURATION: 95 % | BODY MASS INDEX: 33.66 KG/M2 | SYSTOLIC BLOOD PRESSURE: 184 MMHG | WEIGHT: 202 LBS | DIASTOLIC BLOOD PRESSURE: 126 MMHG | HEART RATE: 109 BPM | RESPIRATION RATE: 18 BRPM

## 2019-02-28 DIAGNOSIS — E78.00 HYPERCHOLESTEREMIA: ICD-10-CM

## 2019-02-28 DIAGNOSIS — J81.0 ACUTE PULMONARY EDEMA (HCC): ICD-10-CM

## 2019-02-28 DIAGNOSIS — I10 ESSENTIAL HYPERTENSION: ICD-10-CM

## 2019-02-28 DIAGNOSIS — I16.0 HYPERTENSIVE URGENCY: Primary | ICD-10-CM

## 2019-02-28 RX ORDER — METOPROLOL TARTRATE 50 MG/1
50 TABLET ORAL 2 TIMES DAILY
Qty: 60 TAB | Refills: 3 | Status: SHIPPED | OUTPATIENT
Start: 2019-02-28 | End: 2019-04-04 | Stop reason: ALTCHOICE

## 2019-02-28 RX ORDER — ATORVASTATIN CALCIUM 20 MG/1
20 TABLET, FILM COATED ORAL DAILY
Qty: 30 TAB | Refills: 3 | Status: SHIPPED | OUTPATIENT
Start: 2019-02-28 | End: 2019-04-10 | Stop reason: SDUPTHER

## 2019-02-28 NOTE — PATIENT INSTRUCTIONS
High Blood Pressure: Care Instructions  Overview    It's normal for blood pressure to go up and down throughout the day. But if it stays up, you have high blood pressure. Another name for high blood pressure is hypertension. Despite what a lot of people think, high blood pressure usually doesn't cause headaches or make you feel dizzy or lightheaded. It usually has no symptoms. But it does increase your risk of stroke, heart attack, and other problems. You and your doctor will talk about your risks of these problems based on your blood pressure. Your doctor will give you a goal for your blood pressure. Your goal will be based on your health and your age. Lifestyle changes, such as eating healthy and being active, are always important to help lower blood pressure. You might also take medicine to reach your blood pressure goal.  Follow-up care is a key part of your treatment and safety. Be sure to make and go to all appointments, and call your doctor if you are having problems. It's also a good idea to know your test results and keep a list of the medicines you take. How can you care for yourself at home? Medical treatment  · If you stop taking your medicine, your blood pressure will go back up. You may take one or more types of medicine to lower your blood pressure. Be safe with medicines. Take your medicine exactly as prescribed. Call your doctor if you think you are having a problem with your medicine. · Talk to your doctor before you start taking aspirin every day. Aspirin can help certain people lower their risk of a heart attack or stroke. But taking aspirin isn't right for everyone, because it can cause serious bleeding. · See your doctor regularly. You may need to see the doctor more often at first or until your blood pressure comes down. · If you are taking blood pressure medicine, talk to your doctor before you take decongestants or anti-inflammatory medicine, such as ibuprofen.  Some of these medicines can raise blood pressure. · Learn how to check your blood pressure at home. Lifestyle changes  · Stay at a healthy weight. This is especially important if you put on weight around the waist. Losing even 10 pounds can help you lower your blood pressure. · If your doctor recommends it, get more exercise. Walking is a good choice. Bit by bit, increase the amount you walk every day. Try for at least 30 minutes on most days of the week. You also may want to swim, bike, or do other activities. · Avoid or limit alcohol. Talk to your doctor about whether you can drink any alcohol. · Try to limit how much sodium you eat to less than 2,300 milligrams (mg) a day. Your doctor may ask you to try to eat less than 1,500 mg a day. · Eat plenty of fruits (such as bananas and oranges), vegetables, legumes, whole grains, and low-fat dairy products. · Lower the amount of saturated fat in your diet. Saturated fat is found in animal products such as milk, cheese, and meat. Limiting these foods may help you lose weight and also lower your risk for heart disease. · Do not smoke. Smoking increases your risk for heart attack and stroke. If you need help quitting, talk to your doctor about stop-smoking programs and medicines. These can increase your chances of quitting for good. When should you call for help? Call 911 anytime you think you may need emergency care. This may mean having symptoms that suggest that your blood pressure is causing a serious heart or blood vessel problem. Your blood pressure may be over 180/120.   For example, call 911 if:    · You have symptoms of a heart attack. These may include:  ? Chest pain or pressure, or a strange feeling in the chest.  ? Sweating. ? Shortness of breath. ? Nausea or vomiting. ? Pain, pressure, or a strange feeling in the back, neck, jaw, or upper belly or in one or both shoulders or arms. ? Lightheadedness or sudden weakness.   ? A fast or irregular heartbeat.     · You have symptoms of a stroke. These may include:  ? Sudden numbness, tingling, weakness, or loss of movement in your face, arm, or leg, especially on only one side of your body. ? Sudden vision changes. ? Sudden trouble speaking. ? Sudden confusion or trouble understanding simple statements. ? Sudden problems with walking or balance. ? A sudden, severe headache that is different from past headaches.     · You have severe back or belly pain.    Do not wait until your blood pressure comes down on its own. Get help right away.   Call your doctor now or seek immediate care if:    · Your blood pressure is much higher than normal (such as 180/120 or higher), but you don't have symptoms.     · You think high blood pressure is causing symptoms, such as:  ? Severe headache.  ? Blurry vision.    Watch closely for changes in your health, and be sure to contact your doctor if:    · Your blood pressure measures higher than your doctor recommends at least 2 times. That means the top number is higher or the bottom number is higher, or both.     · You think you may be having side effects from your blood pressure medicine. Where can you learn more? Go to http://nasra-ashleigh.info/. Enter N657 in the search box to learn more about \"High Blood Pressure: Care Instructions. \"  Current as of: July 22, 2018  Content Version: 11.9  © 4724-1352 [employer]. Care instructions adapted under license by [employer] (which disclaims liability or warranty for this information). If you have questions about a medical condition or this instruction, always ask your healthcare professional. [place] [address] any warranty or liability for your use of this information.

## 2019-02-28 NOTE — LETTER
NOTIFICATION RETURN TO WORK / SCHOOL 
 
2/28/2019 7:54 AM 
 
Mr. Vanessa Su [address] [place] [address] [phone] To Whom It May Concern: 
 
Vanessa Su was seen in the office of Shantanu Townsend. He will return to work/school on: 2/28/19 If there are questions or concerns please have the patient contact our office.  
 
 
 
Sincerely, 
 
 
Konstantin Goldstein NP

## 2019-02-28 NOTE — PROGRESS NOTES
Beverly Schneider is a 44 y.o. male  Chief Complaint   Patient presents with    Hypertension     1. Have you been to the ER, urgent care clinic since your last visit? Hospitalized since your last visit? No    2. Have you seen or consulted any other health care providers outside of the [address] [place] [address] since your last visit? Include any pap smears or colon screening.  No

## 2019-02-28 NOTE — PROGRESS NOTES
Abiodun Winn is a 44 y.o.  male and presents with    Chief Complaint   Patient presents with    Hypertension       Subjective:  Mr. Mike Donald presents today for follow up of his hypertension. He was seen in the office on 2/25 as a new patient with complaints of shortness of breath and sputum production. Symptoms were present since December 2018. He was worked up in the ED on 2 occasions but left AMA stating he was not told what was going on. When seen on 2/25 blood pressure was elevated due to non compliance with medication regimen x > 1 year. He denied chest pain. Chest xray was ordered as well as restarting Losartan, Amlodipine, and HCTZ. He was also prescribed prednisone and albuterol for his breathing. Today, he states he was unable to get any of his blood pressure medications- stating pharmacy did not have medication ready when he went to pick it up and he was unable to afford it despite having health insurance. He was able to  the steroids. He reports a drastic improvement in his breathing. He is now able to lay flat without any difficulty in breathing when he was unable to do so 3 days ago. He denies chest pain and shortness breath. He does not have a blood pressure cuff at home. Additional Concerns: No       Patient Active Problem List   Diagnosis Code    Inguinal hernia of left side without obstruction or gangrene K40.90    History of kidney stones Z87.442    Essential hypertension I10    Rectal bleeding K62.5    Internal and external prolapsed hemorrhoids K64.8    Acute pulmonary edema (HCC) J81.0    Hypertensive emergency I16.1     Current Outpatient Medications   Medication Sig Dispense Refill    predniSONE (STERAPRED) 5 mg dose pack See administration instruction per 5mg dose pack 21 Tab 0    albuterol (PROVENTIL HFA, VENTOLIN HFA, PROAIR HFA) 90 mcg/actuation inhaler Take 1 Puff by inhalation every four (4) hours as needed for Wheezing or Shortness of Breath.  1 Inhaler 3    losartan (COZAAR) 50 mg tablet Take 1 Tab by mouth two (2) times a day. 60 Tab 0    amLODIPine (NORVASC) 10 mg tablet Take 1 Tab by mouth daily. 30 Tab 0    hydroCHLOROthiazide (HYDRODIURIL) 25 mg tablet Take 1 Tab by mouth daily. 30 Tab 0    atorvastatin (LIPITOR) 20 mg tablet Take 1 Tab by mouth daily. 30 Tab 3    metoprolol tartrate (LOPRESSOR) 50 mg tablet Take 1 Tab by mouth two (2) times a day. 60 Tab 3     Allergies   Allergen Reactions    Lisinopril Other (comments)     Sore throat     Past Medical History:   Diagnosis Date    Chronic pain     back    GERD (gastroesophageal reflux disease)     Hypertension     Kidney stone      Past Surgical History:   Procedure Laterality Date    COLONOSCOPY N/A 9/15/2017    COL[facility] performed [facility] Stefan[facility] at [facility] END[facility]COPY    HX GI      hemorrhoidectomy    HX HEMORRHOIDECTOMY  11/02/2017    HX ORTHOPAEDIC      left ankle, hardware in place     History reviewed. No pertinent family history. Social History     Tobacco Use    Smoking status: Current Every Day Smoker     Packs/day: 1.00     Years: 28.00     Pack years: 28.00     Types: Cigarettes    Smokeless tobacco: Current User   Substance Use Topics    Alcohol use: Yes     Comment: ocassionally       ROS   History obtained from the patient  General ROS: negative for - chills or fever  Respiratory ROS: no cough, shortness of breath, or wheezing  Cardiovascular ROS: no chest pain or dyspnea on exertion    All other systems reviewed and are negative.       Objective:  Vitals:    02/28/19 0723 02/28/19 0726   BP: (!) 174/123 (!) 184/126   Pulse: (!) 109    Resp: 18    Temp: 95.9 °F (35.5 °C)    TempSrc: Oral    SpO2: 95%    Weight: 202 lb (91.6 kg)    Height: 5' 5\" (1.651 m)    PainSc:   0 - No pain        PE  General appearance - alert, well appearing, and in no distress  Mental status - normal mood, behavior, speech, dress, motor activity, and thought processes  Chest - clear to auscultation, no wheezes, rales or rhonchi, symmetric air entry  Heart - tachycardic and regular rhythm  Extremities - peripheral pulses normal, no pedal edema, no clubbing or cyanosis      LABS   Lab Results   Component Value Date/Time    WBC 5.7 02/25/2019 12:00 AM    HGB 15.1 02/25/2019 12:00 AM    HCT 43.9 02/25/2019 12:00 AM    PLATELET 548 19/47/2896 12:00 AM    MCV 92 02/25/2019 12:00 AM     Lab Results   Component Value Date/Time    Cholesterol, total 226 (H) 02/25/2019 12:00 AM    HDL Cholesterol 40 02/25/2019 12:00 AM    LDL, calculated 160 (H) 02/25/2019 12:00 AM    Triglyceride 128 02/25/2019 12:00 AM     Lab Results   Component Value Date/Time    TSH 0.472 02/25/2019 12:00 AM    T4, Free 1.35 02/25/2019 12:00 AM      Lab Results   Component Value Date/Time    Troponin-I, Qt. 0.02 02/25/2019 12:00 AM      Lab Results   Component Value Date/Time    Sodium 147 (H) 02/25/2019 12:00 AM    Potassium 3.9 02/25/2019 12:00 AM    Chloride 107 (H) 02/25/2019 12:00 AM    CO2 20 02/25/2019 12:00 AM    Anion gap 7 12/24/2018 04:00 AM    Glucose 102 (H) 02/25/2019 12:00 AM    BUN 9 02/25/2019 12:00 AM    Creatinine 1.06 02/25/2019 12:00 AM    BUN/Creatinine ratio 8 (L) 02/25/2019 12:00 AM    GFR est  02/25/2019 12:00 AM    GFR est non-AA 86 02/25/2019 12:00 AM    Calcium 9.1 02/25/2019 12:00 AM    Bilirubin, total 0.4 02/25/2019 12:00 AM    ALT (SGPT) 32 02/25/2019 12:00 AM    AST (SGOT) 30 02/25/2019 12:00 AM    Alk. phosphatase 81 02/25/2019 12:00 AM    Protein, total 6.7 02/25/2019 12:00 AM    Albumin 4.2 02/25/2019 12:00 AM    Globulin 3.3 12/24/2018 04:00 AM    A-G Ratio 1.7 02/25/2019 12:00 AM        TESTS  Chest Xray 2/25/19: IMPRESSION:     Borderline enlarged cardiac silhouette and prominent interstitial markings  bilaterally. Mild interstitial edema suspected. Assessment/Plan:    1.  Hypertensive Urgency- asymptomatic; has not picked up medications as recommended 3 days ago; discussed risk of heart attack, stroke, and death; strongly recommended to go to ED for further evaluation; patient states he will not go to [facility] ED and will go to ED in [place]    2. Acute Pulmonary Edema- improvement in breathing since starting steroids; continue current regimen; Albuterol PRN SOB and/or wheezing    3. Hypercholesterolemia- script for Lipitor sent to pharmacy     Lab review: labs are reviewed, up to date and stable. I note pro-BNP elevated      Today's Visit: Metoprolol and Lipitor    Health Maintenance: Will address at next office visit    I have discussed the diagnosis with the patient and the intended plan as seen in the above orders. The patient has received an after-visit summary and questions were answered concerning future plans. I have discussed medication side effects and warnings with the patient as well. I have reviewed the plan of care with the patient, accepted their input and they are in agreement with the treatment goals. Follow-up Disposition:  Return in about 1 week (around 3/7/2019) for follow up hypertension. More than 1/2 of this 15 minute visit was spent in counseling and coordination of care, as described above.     ESTEPHANIA Moss

## 2019-03-07 ENCOUNTER — OFFICE VISIT (OUTPATIENT)
Dept: FAMILY MEDICINE CLINIC | Age: 44
End: 2019-03-07

## 2019-03-07 VITALS
OXYGEN SATURATION: 95 % | HEART RATE: 89 BPM | BODY MASS INDEX: 33.66 KG/M2 | DIASTOLIC BLOOD PRESSURE: 81 MMHG | SYSTOLIC BLOOD PRESSURE: 117 MMHG | WEIGHT: 202 LBS | HEIGHT: 65 IN | TEMPERATURE: 97.6 F | RESPIRATION RATE: 18 BRPM

## 2019-03-07 DIAGNOSIS — I10 ESSENTIAL HYPERTENSION: Primary | ICD-10-CM

## 2019-03-07 DIAGNOSIS — F17.210 CIGARETTE SMOKER: ICD-10-CM

## 2019-03-07 NOTE — PROGRESS NOTES
Maria Luisa Abdullahi is a 44 y.o.  male and presents with    Chief Complaint   Patient presents with    Hypertension       Subjective:  Mr. Tiffanie Ramsey presents today for follow up of his blood pressure. He reports some fatigue since restarting his blood pressure medication  Cardiovascular Review:  The patient has hypertension. Diet and Lifestyle: generally follows a low sodium diet  Home BP Monitoring: is not measured at home. Pertinent ROS: taking medications as instructed, no medication side effects noted, no TIA's, no chest pain on exertion, no dyspnea on exertion, no swelling of ankles. He is interested in smoking cessation. He stopped in the past for several years. He admits smoking is related to stress. Additional Concerns: No        Patient Active Problem List   Diagnosis Code    Inguinal hernia of left side without obstruction or gangrene K40.90    History of kidney stones Z87.442    Essential hypertension I10    Rectal bleeding K62.5    Internal and external prolapsed hemorrhoids K64.8    Acute pulmonary edema (HCC) J81.0    Hypertensive emergency I16.1     Current Outpatient Medications   Medication Sig Dispense Refill    atorvastatin (LIPITOR) 20 mg tablet Take 1 Tab by mouth daily. 30 Tab 3    metoprolol tartrate (LOPRESSOR) 50 mg tablet Take 1 Tab by mouth two (2) times a day. 60 Tab 3    albuterol (PROVENTIL HFA, VENTOLIN HFA, PROAIR HFA) 90 mcg/actuation inhaler Take 1 Puff by inhalation every four (4) hours as needed for Wheezing or Shortness of Breath. 1 Inhaler 3    losartan (COZAAR) 50 mg tablet Take 1 Tab by mouth two (2) times a day. 60 Tab 0    amLODIPine (NORVASC) 10 mg tablet Take 1 Tab by mouth daily. 30 Tab 0    hydroCHLOROthiazide (HYDRODIURIL) 25 mg tablet Take 1 Tab by mouth daily.  30 Tab 0     Allergies   Allergen Reactions    Lisinopril Other (comments)     Sore throat     Past Medical History:   Diagnosis Date    Chronic pain     back    GERD (gastroesophageal reflux disease)     Hypertension     Kidney stone      Past Surgical History:   Procedure Laterality Date    COLONOSCOPY N/A 9/15/2017    [facility]    HX GI      hemorrhoidectomy    HX HEMORRHOIDECTOMY  11/02/2017    HX ORTHOPAEDIC      left ankle, hardware in place     No family history on file. Social History     Tobacco Use    Smoking status: Current Every Day Smoker     Packs/day: 1.00     Years: 28.00     Pack years: 28.00     Types: Cigarettes    Smokeless tobacco: Current User   Substance Use Topics    Alcohol use: Yes     Comment: ocassionally       ROS   History obtained from the patient  General ROS: positive for  - fatigue  Respiratory ROS: no cough, shortness of breath, or wheezing  Cardiovascular ROS: no chest pain or dyspnea on exertion    All other systems reviewed and are negative.       Objective:  Vitals:    03/07/19 0749   BP: 117/81   Pulse: 89   Resp: 18   Temp: 97.6 °F (36.4 °C)   TempSrc: Oral   SpO2: 95%   Weight: 202 lb (91.6 kg)   Height: 5' 5\" (1.651 m)   PainSc:   0 - No pain       PE  General appearance - alert, well appearing, and in no distress  Mental status - normal mood, behavior, speech, dress, motor activity, and thought processes  Chest - clear to auscultation, no wheezes, rales or rhonchi, symmetric air entry  Heart - normal rate and regular rhythm    LABS  Lab Results   Component Value Date/Time    WBC 5.7 02/25/2019 12:00 AM    HGB 15.1 02/25/2019 12:00 AM    HCT 43.9 02/25/2019 12:00 AM    PLATELET 053 02/77/7451 12:00 AM    MCV 92 02/25/2019 12:00 AM     Lab Results   Component Value Date/Time    Cholesterol, total 226 (H) 02/25/2019 12:00 AM    HDL Cholesterol 40 02/25/2019 12:00 AM    LDL, calculated 160 (H) 02/25/2019 12:00 AM    Triglyceride 128 02/25/2019 12:00 AM     Lab Results   Component Value Date/Time    TSH 0.472 02/25/2019 12:00 AM    T4, Free 1.35 02/25/2019 12:00 AM      Lab Results Component Value Date/Time    Sodium 147 (H) 02/25/2019 12:00 AM    Potassium 3.9 02/25/2019 12:00 AM    Chloride 107 (H) 02/25/2019 12:00 AM    CO2 20 02/25/2019 12:00 AM    Anion gap 7 12/24/2018 04:00 AM    Glucose 102 (H) 02/25/2019 12:00 AM    BUN 9 02/25/2019 12:00 AM    Creatinine 1.06 02/25/2019 12:00 AM    BUN/Creatinine ratio 8 (L) 02/25/2019 12:00 AM    GFR est  02/25/2019 12:00 AM    GFR est non-AA 86 02/25/2019 12:00 AM    Calcium 9.1 02/25/2019 12:00 AM    Bilirubin, total 0.4 02/25/2019 12:00 AM    ALT (SGPT) 32 02/25/2019 12:00 AM    AST (SGOT) 30 02/25/2019 12:00 AM    Alk. phosphatase 81 02/25/2019 12:00 AM    Protein, total 6.7 02/25/2019 12:00 AM    Albumin 4.2 02/25/2019 12:00 AM    Globulin 3.3 12/24/2018 04:00 AM    A-G Ratio 1.7 02/25/2019 12:00 AM        Assessment/Plan:    1. Hypertension- BP well controlled with current regimen; continue; return in 1 month for BP check and medication evaluation    2. Tobacco Abuse- The patient was counseled on the dangers of tobacco use, and was advised to quit. Reviewed strategies to maximize success, including removing cigarettes and smoking materials from environment, support of family/friends and written materials. Total time spent in discussion: 3 minutes    Lab review: labs are reviewed, up to date and stable    Today's Visit: Education, counseling    Health Maintenance:   Influenza Vaccine- declined  Pneumococcal Vaccine- declined    I have discussed the diagnosis with the patient and the intended plan as seen in the above orders. The patient has received an after-visit summary and questions were answered concerning future plans. I have discussed medication side effects and warnings with the patient as well. I have reviewed the plan of care with the patient, accepted their input and they are in agreement with the treatment goals.        Follow-up Disposition:  Return in about 1 month (around 4/7/2019) for follow up blood pressure/medication ricardo.  More than 1/2 of this 15 minute visit was spent in counseling and coordination of care, as described above.     ESTEPHANIA Bauman

## 2019-03-07 NOTE — PROGRESS NOTES
Jayson Nfef is a 44 y.o. male  Chief Complaint   Patient presents with    Hypertension       1. Have you been to the ER, urgent care clinic since your last visit? Hospitalized since your last visit? No    2. Have you seen or consulted any other health care providers outside of the [address] [place] [address] since your last visit? Include any pap smears or colon screening.  No

## 2019-03-07 NOTE — LETTER
NOTIFICATION RETURN TO WORK / SCHOOL 
 
3/7/2019 8:17 AM 
 
Mr. Samantha Yuan [address] [place] [address] [phone] To Whom It May Concern: 
 
Samantha Yuan was seen at [address] [place] [address] today. He will return to work/school on: 3/7/19 If there are questions or concerns please have the patient contact our office.  
 
 
 
Sincerely, 
 
 
Maury Rowley, NP

## 2019-03-07 NOTE — PATIENT INSTRUCTIONS
High Blood Pressure: Care Instructions  Overview    It's normal for blood pressure to go up and down throughout the day. But if it stays up, you have high blood pressure. Another name for high blood pressure is hypertension. Despite what a lot of people think, high blood pressure usually doesn't cause headaches or make you feel dizzy or lightheaded. It usually has no symptoms. But it does increase your risk of stroke, heart attack, and other problems. You and your doctor will talk about your risks of these problems based on your blood pressure. Your doctor will give you a goal for your blood pressure. Your goal will be based on your health and your age. Lifestyle changes, such as eating healthy and being active, are always important to help lower blood pressure. You might also take medicine to reach your blood pressure goal.  Follow-up care is a key part of your treatment and safety. Be sure to make and go to all appointments, and call your doctor if you are having problems. It's also a good idea to know your test results and keep a list of the medicines you take. How can you care for yourself at home? Medical treatment  · If you stop taking your medicine, your blood pressure will go back up. You may take one or more types of medicine to lower your blood pressure. Be safe with medicines. Take your medicine exactly as prescribed. Call your doctor if you think you are having a problem with your medicine. · Talk to your doctor before you start taking aspirin every day. Aspirin can help certain people lower their risk of a heart attack or stroke. But taking aspirin isn't right for everyone, because it can cause serious bleeding. · See your doctor regularly. You may need to see the doctor more often at first or until your blood pressure comes down. · If you are taking blood pressure medicine, talk to your doctor before you take decongestants or anti-inflammatory medicine, such as ibuprofen.  Some of these medicines can raise blood pressure. · Learn how to check your blood pressure at home. Lifestyle changes  · Stay at a healthy weight. This is especially important if you put on weight around the waist. Losing even 10 pounds can help you lower your blood pressure. · If your doctor recommends it, get more exercise. Walking is a good choice. Bit by bit, increase the amount you walk every day. Try for at least 30 minutes on most days of the week. You also may want to swim, bike, or do other activities. · Avoid or limit alcohol. Talk to your doctor about whether you can drink any alcohol. · Try to limit how much sodium you eat to less than 2,300 milligrams (mg) a day. Your doctor may ask you to try to eat less than 1,500 mg a day. · Eat plenty of fruits (such as bananas and oranges), vegetables, legumes, whole grains, and low-fat dairy products. · Lower the amount of saturated fat in your diet. Saturated fat is found in animal products such as milk, cheese, and meat. Limiting these foods may help you lose weight and also lower your risk for heart disease. · Do not smoke. Smoking increases your risk for heart attack and stroke. If you need help quitting, talk to your doctor about stop-smoking programs and medicines. These can increase your chances of quitting for good. When should you call for help? Call 911 anytime you think you may need emergency care. This may mean having symptoms that suggest that your blood pressure is causing a serious heart or blood vessel problem. Your blood pressure may be over 180/120.   For example, call 911 if:    · You have symptoms of a heart attack. These may include:  ? Chest pain or pressure, or a strange feeling in the chest.  ? Sweating. ? Shortness of breath. ? Nausea or vomiting. ? Pain, pressure, or a strange feeling in the back, neck, jaw, or upper belly or in one or both shoulders or arms. ? Lightheadedness or sudden weakness.   ? A fast or irregular heartbeat.     · You have symptoms of a stroke. These may include:  ? Sudden numbness, tingling, weakness, or loss of movement in your face, arm, or leg, especially on only one side of your body. ? Sudden vision changes. ? Sudden trouble speaking. ? Sudden confusion or trouble understanding simple statements. ? Sudden problems with walking or balance. ? A sudden, severe headache that is different from past headaches.     · You have severe back or belly pain.    Do not wait until your blood pressure comes down on its own. Get help right away.   Call your doctor now or seek immediate care if:    · Your blood pressure is much higher than normal (such as 180/120 or higher), but you don't have symptoms.     · You think high blood pressure is causing symptoms, such as:  ? Severe headache.  ? Blurry vision.    Watch closely for changes in your health, and be sure to contact your doctor if:    · Your blood pressure measures higher than your doctor recommends at least 2 times. That means the top number is higher or the bottom number is higher, or both.     · You think you may be having side effects from your blood pressure medicine. Where can you learn more? Go to http://ansra-ashleigh.info/. Enter Z074 in the search box to learn more about \"High Blood Pressure: Care Instructions. \"  Current as of: July 22, 2018  Content Version: 11.9  © 3516-1270 [employer]. Care instructions adapted under license by [employer] (which disclaims liability or warranty for this information). If you have questions about a medical condition or this instruction, always ask your healthcare professional. [place] [address] any warranty or liability for your use of this information. Learning About Benefits From Quitting Smoking  How does quitting smoking make you healthier? If you're thinking about quitting smoking, you may have a few reasons to be smoke-free.  Your health may be one of them.  · When you quit smoking, you lower your risks for cancer, lung disease, heart attack, stroke, blood vessel disease, and blindness from macular degeneration. · When you're smoke-free, you get sick less often, and you heal faster. You are less likely to get colds, flu, bronchitis, and pneumonia. · As a nonsmoker, you may find that your mood is better and you are less stressed. When and how will you feel healthier? Quitting has real health benefits that start from day 1 of being smoke-free. And the longer you stay smoke-free, the healthier you get and the better you feel. The first hours  · After just 20 minutes, your blood pressure and heart rate go down. That means there's less stress on your heart and blood vessels. · Within 12 hours, the level of carbon monoxide in your blood drops back to normal. That makes room for more oxygen. With more oxygen in your body, you may notice that you have more energy than when you smoked. After 2 weeks  · Your lungs start to work better. · Your risk of heart attack starts to drop. After 1 month  · When your lungs are clear, you cough less and breathe deeper, so it's easier to be active. · Your sense of taste and smell return. That means you can enjoy food more than you have since you started smoking. Over the years  · After 1 year, your risk of heart disease is half what it would be if you kept smoking. · After 5 years, your risk of stroke starts to shrink. Within a few years after that, it's about the same as if you'd never smoked. · After 10 years, your risk of dying from lung cancer is cut by about half. And your risk for many other types of cancer is lower too. How would quitting help others in your life? When you quit smoking, you improve the health of everyone who now breathes in your smoke. · Their heart, lung, and cancer risks drop, much like yours. · They are sick less.  For babies and small children, living smoke-free means they're less likely to have ear infections, pneumonia, and bronchitis. · If you're a woman who is or will be pregnant someday, quitting smoking means a healthier . · Children who are close to you are less likely to become adult smokers. Where can you learn more? Go to http://nasra-ashleigh.info/. Enter 052 [id number] [id number] 11 in the search box to learn more about \"Learning About Benefits From Quitting Smoking. \"  Current as of: 2018  Content Version: 11.9  © 2421-7428 [employer]. Care instructions adapted under license by [employer] (which disclaims liability or warranty for this information). If you have questions about a medical condition or this instruction, always ask your healthcare professional. [address] any warranty or liability for your use of this information.

## 2019-03-14 NOTE — PROGRESS NOTES
Acute Care - Speech Language Pathology   Swallow Eval/Discharge  [place]     Patient Name: Felisha Bella  : 1925  MRN: [id number]  Today's Date: 3/14/2019               Admit Date: 3/13/2019  Clinical swallow evaluation completed. Patient was alert and cooperative sitting in chair. Daughter's present during evaluation. Oral motor assessment revealed age related generalized weakness which was functional for patient. Patient completed regular solid trials and thin liquid trials. No overt s/s were observed. Functional mastication of solid trial with clearance of residue post swallow. ST cannot fully rule out aspiration but feels that patient is not at a high risk when standard swallow precautions are followed.     ST recommends: 1) Regular diet 2) Thin liquids 3) Meds whole with thin liquids. ST to sign off at this time. If concerns arise please reconsult ST.     Raquel Alvarez, MS, CFY-SLP 3/14/2019 11:14 AM      Visit Dx:    ICD-10-CM ICD-9-CM   1. Cerebrovascular accident (CVA), unspecified mechanism (CMS/HCC) I63.9 434.91   2. Syncope and collapse R55 780.2   3. Dysphagia, unspecified type R13.10 787.20     Patient Active Problem List   Diagnosis   • Cerebrovascular accident (CVA) (CMS/HCC)     Past Medical History:   Diagnosis Date   • Alzheimer disease    • Glaucoma    • Hypertension    • Macular degeneration    • Osteoarthritis    • Sciatica      History reviewed. No pertinent surgical history.       SWALLOW EVALUATION (last 72 hours)      SLP Adult Swallow Evaluation     Row Name 19 1000                   Rehab Evaluation    Document Type  evaluation  -MM        Subjective Information  no complaints  -MM        Patient Observations  alert;cooperative;agree to therapy  -MM        Patient/Family Observations  Daughters present.   -MM        Patient Effort  good  -MM        Symptoms Noted During/After Treatment  none  -MM           General Information    Patient Profile Reviewed  yes  -MM     [facility]  [address] [place] [address], [facility]        Colon and Rectal Surgery    Subjective:      Dee Garcia is a 37 y.o. male who presents for colonoscopy consultation for repeated episodes of bright red rectal bleeding often mixed with stool. This has been on and off for the past 15 years. The patient denies any anorectal pain, change in bowel habits, weight changes, nor any abdominal pain. Patient denies constipation, vomiting, diarrhea, mucousy stools, loss of appetite, reflux and nausea. There is not a family history of colon cancer. His sister has history of Crohn's disease. Patient Active Problem List    Diagnosis Date Noted    History of kidney stones 08/14/2017    Essential hypertension 08/14/2017    Inguinal hernia of left side without obstruction or gangrene 06/01/2017     Past Medical History:   Diagnosis Date    Hypertension     Kidney stone       No past surgical history on file. Social History   Substance Use Topics    Smoking status: Current Every Day Smoker     Types: Cigarettes    Smokeless tobacco: Never Used    Alcohol use No      Comment: ocassionally      No family history on file. Prior to Admission medications    Medication Sig Start Date End Date Taking? Authorizing Provider   PEG 3350-Electrolytes (GO-LYTELY) 236-22.74-6.74 -5.86 gram suspension Take as directed for bowel prep  Indications: BOWEL EVACUATION 8/21/17  Yes Gail Sebastian MD   hydroCHLOROthiazide (HYDRODIURIL) 25 mg tablet Take 1 Tab by mouth daily. 8/14/17  Yes Daphne Feng MD   losartan (COZAAR) 50 mg tablet Take 1 Tab by mouth two (2) times a day. 8/14/17  Yes Daphne Feng MD   metoprolol succinate (TOPROL-XL) 50 mg XL tablet Take 1 Tab by mouth daily. 8/14/17  Yes Daphne Feng MD   cyclobenzaprine (FLEXERIL) 10 mg tablet Take 1 Tab by mouth three (3) times daily as needed for Muscle Spasm(s).  6/1/17  Yes Daphne Feng MD     Pertinent History Of Current Problem  Dementia, GERD, HTN, hx of CVA, failed screen due to voice being unclear, Head CT: subtle cortical hemorrhage right temporal lobe  -MM        Current Method of Nutrition  NPO  -MM        Precautions/Limitations, Vision  WFL with corrective lenses  -MM        Precautions/Limitations, Hearing  WFL;for purposes of eval  -MM        Prior Level of Function-Communication  cognitive-linguistic impairment Alzhiemer's dementia   -MM        Prior Level of Function-Swallowing  no diet consistency restrictions  -MM        Plans/Goals Discussed with  patient and family;other (see comments);agreed upon RN Luis Alberto   -MM        Barriers to Rehab  none identified  -MM        Patient's Goals for Discharge  return to PO diet  -MM        Family Goals for Discharge  patient able to return to PO diet  -MM           Pain Assessment    Additional Documentation  Pain Scale: FACES Pre/Post-Treatment (Group)  -MM           Pain Scale: FACES Pre/Post-Treatment    Pain: FACES Scale, Pretreatment  0-->no hurt  -MM        Pain: FACES Scale, Post-Treatment  0-->no hurt  -MM           Oral Motor and Function    Dentition Assessment  natural, present and adequate  -MM        Secretion Management  WNL/WFL  -MM        Mucosal Quality  moist, healthy  -MM           Oral Musculature and Cranial Nerve Assessment    Oral Motor General Assessment  WFL;generalized oral motor weakness  -MM           General Eating/Swallowing Observations    Respiratory Support Currently in Use  room air  -MM        Eating/Swallowing Skills  fed by SLP;self-fed  -MM        Positioning During Eating  upright in bed  -MM        Utensils Used  cup;straw  -MM        Consistencies Trialed  regular textures;thin liquids  -MM           Clinical Swallow Eval    Oral Prep Phase  WFL  -MM        Oral Transit  WFL  -MM        Oral Residue  WFL  -MM        Pharyngeal Phase  no overt signs/symptoms of pharyngeal impairment  -MM        Esophageal  oxyCODONE-acetaminophen (PERCOCET) 5-325 mg per tablet Take 1 Tab by mouth every eight (8) hours as needed for Pain. Max Daily Amount: 3 Tabs. 6/15/17   Torrey Hammer MD     Current Outpatient Prescriptions   Medication Sig    PEG 3350-Electrolytes (GO-LYTELY) 236-22.74-6.74 -5.86 gram suspension Take as directed for bowel prep  Indications: BOWEL EVACUATION    hydroCHLOROthiazide (HYDRODIURIL) 25 mg tablet Take 1 Tab by mouth daily.  losartan (COZAAR) 50 mg tablet Take 1 Tab by mouth two (2) times a day.  metoprolol succinate (TOPROL-XL) 50 mg XL tablet Take 1 Tab by mouth daily.  cyclobenzaprine (FLEXERIL) 10 mg tablet Take 1 Tab by mouth three (3) times daily as needed for Muscle Spasm(s).  oxyCODONE-acetaminophen (PERCOCET) 5-325 mg per tablet Take 1 Tab by mouth every eight (8) hours as needed for Pain. Max Daily Amount: 3 Tabs. No current facility-administered medications for this visit. Allergies   Allergen Reactions    Lisinopril Other (comments)     Sore throat        Review of Systems:    A comprehensive review of systems was negative except for that written in the History of Present Illness. Objective:     Visit Vitals    BP (!) 175/115 (BP 1 Location: Right arm, BP Patient Position: Sitting)    Pulse (!) 112    Temp 97.6 °F (36.4 °C) (Oral)    Resp 18    Ht 5' 5\" (1.651 m)    Wt 96.7 kg (213 lb 3.2 oz)    SpO2 98%    BMI 35.48 kg/m2        Physical Exam:   GENERAL: alert, cooperative, no distress, appears stated age  LYMPHATIC: Cervical, supraclavicular, and axillary nodes normal.   THROAT & NECK: normal and no erythema or exudates noted. LUNG: clear to auscultation bilaterally  HEART: regular rate and rhythm, S1, S2 normal, no murmur, click, rub or gallop  ABDOMEN: soft, non-tender.  Bowel sounds normal. No masses,  no organomegaly  EXTREMITIES:  extremities normal, atraumatic, no cyanosis or edema  SKIN: Normal.  NEUROLOGIC: negative  PSYCHIATRIC: non focal Assessment:     Eduardo Britton is a 37 y.o. male who requires colonoscopy for chronic rectal bleeding symptoms. Plan:     1. I recommend proceeding with colonoscopy. The patient was in full agreement and was eager to proceed. 2. I discussed the details of the colonoscopy procedure. The risks of colonoscopy were discussed including colon injury/perforation, anesthesia issues, bleeding, and the possibility of incomplete examination. The patient was willing to accept these risks and proceed with the examination. All questions were answered to the patient's satisfaction. 3. The patient was provided with the instructions in preparation for the colonoscopy procedure including the bowel prep recommendations. Thank you for allowing me to participate in the patient's care.          Dionicio Dolan MD, FACS, FASCRS  Colon and Rectal Surgery  [facility]  Office [phone]  Fax     [phone]  8/21/2017  4:26 PM Phase  unremarkable  -MM        Clinical Swallow Evaluation Summary  Clinical swallow evaluation completed. Patient was alert and cooperative sitting in chair. Daughter's present during evaluation. Oral motor assessment revealed age related generalized weakness which was functional for patient. Patient completed regular solid trials and thin liquid trials. No overt s/s were observed. Functional mastication of solid trial with clearance of residue post swallow. ST cannot fully rule out aspiration but feels that patient is not at a high risk when standard swallow precautions are followed. ST recommends: 1) Regular diet 2) Thin liquids 3) Meds whole with thin liquids. ST to sign off at this time. If concerns arise please reconsult ST.   -MM           Clinical Impression    SLP Swallowing Diagnosis  functional oral phase;functional pharyngeal phase  -MM        Functional Impact  no impact on function  -MM        Rehab Potential/Prognosis, Swallowing  good, to achieve stated therapy goals  -MM        Swallow Criteria for Skilled Therapeutic Interventions Met  baseline status;no problems identified which require skilled intervention  -MM           Recommendations    Therapy Frequency (Swallow)  evaluation only  -MM        Predicted Duration Therapy Intervention (Days)  until discharge  -MM        SLP Diet Recommendation  regular textures;thin liquids  -MM        Recommended Precautions and Strategies  upright posture during/after eating;small bites of food and sips of liquid  -MM        SLP Rec. for Method of Medication Administration  meds whole;with thin liquids;as tolerated  -MM        Monitor for Signs of Aspiration  yes;notify SLP if any concerns;cough;gurgly voice;throat clearing;right lower lobe infiltrates;pneumonia  -MM        Anticipated Dischage Disposition  home with home health  -MM          User Key  (r) = Recorded By, (t) = Taken By, (c) = Cosigned By    Initials Name Effective Dates    MM Raquel Alvarez  ZAIN MS, CFY-SLP 07/03/18 -           EDUCATION  The patient has been educated in the following areas:   Dysphagia (Swallowing Impairment).    SLP Recommendation and Plan  SLP Swallowing Diagnosis: functional oral phase, functional pharyngeal phase  SLP Diet Recommendation: regular textures, thin liquids     Monitor for Signs of Aspiration: yes, notify SLP if any concerns, cough, gurgly voice, throat clearing, right lower lobe infiltrates, pneumonia     Swallow Criteria for Skilled Therapeutic Interventions Met: baseline status, no problems identified which require skilled intervention  Anticipated Dischage Disposition: home with home health  Rehab Potential/Prognosis, Swallowing: good, to achieve stated therapy goals  Therapy Frequency (Swallow): evaluation only  Predicted Duration Therapy Intervention (Days): until discharge       Plan of Care Reviewed With: patient, daughter, other (see comments)(NOEL Sahu )  Progress: no change(Initial Evaluation )  Outcome Summary: Clinical swallow evaluation completed. Patient was alert and cooperative sitting in chair. Daughter's present during evaluation. Oral motor assessment revealed age related generalized weakness which was functional for patient. Patient completed regular solid trials and thin liquid trials. No overt s/s were observed. Functional mastication of solid trial with clearance of residue post swallow. ST cannot fully rule out aspiration but feels that patient is not at a high risk when standard swallow precautions are followed. ST recommends: 1) Regular diet 2) Thin liquids 3) Meds whole with thin liquids. ST to sign off at this time. If concerns arise please reconsult ST.              Time Calculation:   Time Calculation- SLP     Row Name 03/14/19 1113             Time Calculation- SLP    SLP Start Time  1000  -MM      SLP Stop Time  1045  -MM      SLP Time Calculation (min)  45 min  -MM      SLP Received On  03/14/19  -MM        User Key  (r) = Recorded By,  (t) = Taken By, (c) = Cosigned By    Initials Name Provider Type     Raquel Alvarez, MS, CFY-SLP Speech and Language Pathologist          Therapy Charges for Today     Code Description Service Date Service Provider Modifiers Qty    76877071704 HC ST EVAL ORAL PHARYNG SWALLOW 3 3/14/2019 Raquel Alvarez, MS, CFY-SLP GN 1               SLP Discharge Summary  Anticipated Dischage Disposition: home with home health  Reason for Discharge: other (see comments)(Eval only )  Progress Toward Achieving Short/long Term Goals: other (see comments)(Eval only )  Discharge Destination: other (see comments)(Patient remains in acute care)    Raquel Alvarez MS, CFY-SLP  3/14/2019

## 2019-03-18 ENCOUNTER — OFFICE VISIT (OUTPATIENT)
Dept: PULMONOLOGY | Facility: CLINIC | Age: 44
End: 2019-03-18

## 2019-03-18 VITALS
HEART RATE: 76 BPM | TEMPERATURE: 98.6 F | BODY MASS INDEX: 33.66 KG/M2 | SYSTOLIC BLOOD PRESSURE: 130 MMHG | OXYGEN SATURATION: 96 % | WEIGHT: 202 LBS | HEIGHT: 65 IN | RESPIRATION RATE: 20 BRPM | DIASTOLIC BLOOD PRESSURE: 90 MMHG

## 2019-03-18 DIAGNOSIS — R06.02 SOB (SHORTNESS OF BREATH) ON EXERTION: Primary | ICD-10-CM

## 2019-03-18 NOTE — PROGRESS NOTES
Chief Complaint   Patient presents with    Shortness of Breath     patient states that he \"caught pneumonia\" and went to the ER and eventually was told he had \"fluid around his heart and lungs. \" he notices a wheeze in the evening and a sometimes cough that have both recently improved. 1. Have you been to the ER, urgent care clinic since your last visit? Hospitalized since your last visit? No    2. Have you seen or consulted any other health care providers outside of the [address] [place] [address] since your last visit? Include any pap smears or colon screening. No       He denies seasonal allergies, or post nasal drip, and notes a history of asthma as a child. He complains that he frequently has bronchitis. Denies lung cancer or any other cancer in his family history.

## 2019-03-18 NOTE — PROGRESS NOTES
[facility]  Pulmonary, Critical Care, and Sleep Medicine       Pulmonary Office Progress Notes        Subjective: The patient presents for evaluation of shortness of breath    History  46-year-old male that started smoking at age 16. At one point he was smoking 2 packs of cigarettes a day. He is now smoking 0.5 packs of cigarettes a day. He works as a . There is no family history of pulmonary or cardiac disease. Severe hypertension, however, is common. In the past 3 months he has been seen in the emergency room twice for shortness of breath. In both instances radiographs suggested pulmonary edema. Echocardiogram on 12/23/18 showed no abnormality of LVSF. There was mild concentric left ventricular hypertrophy. The right ventricle was normal, but the right atrium was mildly dilated. PASP was estimated at 43 mmHg. The patient's shortness of breath responded to bronchodilators. Nevertheless, this does not explain the changes on the radiographs suggesting pulmonary edema. The patient has been out of his antihypertensives until the last month or so. Now that he is taking his antihypertensives, shortness of breath, orthopnea, PND, wheezing and cough have all improved to resolved. Diastolic blood pressures have been documented into the 120 range. Review of systems   He denies fevers, night sweats or weight loss. He has had no dizziness. Chest pains have resolved. He denies dysphasia or abdominal pain. He has had no lower edema. Review of systems was completed in its entirety and is otherwise normal    Past medical history  Hypertension  Childhood asthma  Gastroesophageal reflux disease  Tobacco abuse    Past surgical history  Hemorrhoidectomy  Left ankle repair    Medication allergies: Lisinopril    Current Outpatient Medications on File Prior to Visit   Medication Sig Dispense Refill    atorvastatin (LIPITOR) 20 mg tablet Take 1 Tab by mouth daily.  30 Tab 3  metoprolol tartrate (LOPRESSOR) 50 mg tablet Take 1 Tab by mouth two (2) times a day. 60 Tab 3    albuterol (PROVENTIL HFA, VENTOLIN HFA, PROAIR HFA) 90 mcg/actuation inhaler Take 1 Puff by inhalation every four (4) hours as needed for Wheezing or Shortness of Breath. 1 Inhaler 3    losartan (COZAAR) 50 mg tablet Take 1 Tab by mouth two (2) times a day. 60 Tab 0    amLODIPine (NORVASC) 10 mg tablet Take 1 Tab by mouth daily. 30 Tab 0    hydroCHLOROthiazide (HYDRODIURIL) 25 mg tablet Take 1 Tab by mouth daily. 30 Tab 0     No current facility-administered medications on file prior to visit. Social history  Smoking and occupational history is as outlined above    Family history  Hypertension        Objective:     He is alert, oriented and in no illicit rest.  Affect is normal.  Blood pressure 130/90, pulse 76, temperature 98.6 °F (37 °C), temperature source Oral, resp. rate 20, height 5' 4.5\" (1.638 m), weight 91.6 kg (202 lb), SpO2 96 %. Sclera are anicteric. Extraocular muscles are intact. Gaze is conjugate. Oral mucosa is moist  Neck is supple and there is no lymphadenopathy or jugular venous distention. Trachea is midline. Lungs are clear to auscultation  Heart has a regular rate and rhythm with out murmur or gallop. There is a soft S4. Abdomen is soft and nondistended  No cyanosis, clubbing or edema. Trace pitting peripheral edema. No facial rash.       Assessment  Dyspnea on exertion secondary to pulmonary edema secondary to uncontrolled hypertension  Childhood asthma  Dyspnea on exertion while being treated for hypertension adequately relieved with albuterol  Tobacco abuse      Plan:  Encourage antihypertensive medication compliance  Encourage smoking cessation  As needed albuterol  As needed return to clinic

## 2019-03-27 DIAGNOSIS — I10 ESSENTIAL HYPERTENSION: ICD-10-CM

## 2019-03-28 RX ORDER — HYDROCHLOROTHIAZIDE 25 MG/1
25 TABLET ORAL DAILY
Qty: 30 TAB | Refills: 2 | Status: SHIPPED | OUTPATIENT
Start: 2019-03-28 | End: 2019-04-10 | Stop reason: SDUPTHER

## 2019-03-28 RX ORDER — LOSARTAN POTASSIUM 50 MG/1
50 TABLET ORAL 2 TIMES DAILY
Qty: 60 TAB | Refills: 2 | Status: SHIPPED | OUTPATIENT
Start: 2019-03-28 | End: 2019-04-10 | Stop reason: SDUPTHER

## 2019-03-28 RX ORDER — AMLODIPINE BESYLATE 10 MG/1
10 TABLET ORAL DAILY
Qty: 30 TAB | Refills: 2 | Status: SHIPPED | OUTPATIENT
Start: 2019-03-28 | End: 2019-04-10 | Stop reason: SDUPTHER

## 2019-04-04 ENCOUNTER — OFFICE VISIT (OUTPATIENT)
Dept: CARDIOLOGY CLINIC | Age: 44
End: 2019-04-04

## 2019-04-04 VITALS
DIASTOLIC BLOOD PRESSURE: 99 MMHG | SYSTOLIC BLOOD PRESSURE: 144 MMHG | HEART RATE: 76 BPM | OXYGEN SATURATION: 95 % | WEIGHT: 207 LBS | BODY MASS INDEX: 34.98 KG/M2

## 2019-04-04 DIAGNOSIS — I70.1 RENAL ARTERY STENOSIS (HCC): Primary | ICD-10-CM

## 2019-04-04 DIAGNOSIS — I10 ESSENTIAL HYPERTENSION: ICD-10-CM

## 2019-04-04 RX ORDER — CARVEDILOL 12.5 MG/1
12.5 TABLET ORAL 2 TIMES DAILY WITH MEALS
Qty: 60 TAB | Refills: 6 | Status: SHIPPED | OUTPATIENT
Start: 2019-04-04 | End: 2019-04-10 | Stop reason: SDUPTHER

## 2019-04-04 NOTE — PATIENT INSTRUCTIONS
Mary Kay Chan or Bebe Valentine will call to schedule your testing within 24-48 hours. If you do not hear from her, then please call her directly at [phone] [place]) or [phone] Marley Avila All testing/lab work is completed in [address] [place] [address] 150  
at [facility]/[address] Stop Metoprolol Start Coreg 12.5mg twice per day

## 2019-04-04 NOTE — PROGRESS NOTES
[facility] Mr. Alexis Coon is a 50-year-old male with a history of hypertension, gastroesophageal reflux disease Mr. Alexis Coon is here today to establish care with me. Mr. Alexis Coon denies any prior history of myocardial infarction or congestive heart failure. He denies any atrial fibrillation. He was asked to come see me for the management of hypertension and diastolic dysfunction. Mr. Alexis Coon was in the hospital 3 months ago with shortness of breath in the setting of uncontrolled hypertension because of noncompliance with hypertensive medication. He was found to have a pulmonary congestion and enlarged heart. Mr. Alexis Coon tells me that for last 1 month he has been taking all his medications regularly. He does not have any chest pain or chest tightness. He denies any palpitation, presyncope or syncope. He denies any PND or lower extremity swelling. He admits that he was not taking his medication regularly in the past however for last 1 month he is taking it regularly. Denies any nausea, vomiting, abdominal pain, fever, chills, sputum production. No hematuria or other bleeding complaints Past Medical History:  
Diagnosis Date  Chronic pain   
 back  GERD (gastroesophageal reflux disease)  Hypertension  Kidney stone Past Surgical History:  
Procedure Laterality Date  COLONOSCOPY N/A 9/15/2017 COLONOSCOPY performed by Nhi Saleh MD at [facility] ENDOSCOPY  
 HX GI    
 hemorrhoidectomy  HX HEMORRHOIDECTOMY  11/02/2017  HX ORTHOPAEDIC    
 left ankle, hardware in place Current Outpatient Medications Medication Sig  
 losartan (COZAAR) 50 mg tablet Take 1 Tab by mouth two (2) times a day.  hydroCHLOROthiazide (HYDRODIURIL) 25 mg tablet Take 1 Tab by mouth daily.  amLODIPine (NORVASC) 10 mg tablet Take 1 Tab by mouth daily.  atorvastatin (LIPITOR) 20 mg tablet Take 1 Tab by mouth daily.  metoprolol tartrate (LOPRESSOR) 50 mg tablet Take 1 Tab by mouth two (2) times a day.  albuterol (PROVENTIL HFA, VENTOLIN HFA, PROAIR HFA) 90 mcg/actuation inhaler Take 1 Puff by inhalation every four (4) hours as needed for Wheezing or Shortness of Breath. No current facility-administered medications for this visit. Allergies and Sensitivities: 
Allergies Allergen Reactions  Lisinopril Other (comments) Sore throat Family History: No family history on file. Social History: 
Social History Tobacco Use  Smoking status: Current Every Day Smoker Packs/day: 1.00 Years: 28.00 Pack years: 28.00 Types: Cigarettes Start date: 3/18/1987  Smokeless tobacco: Current User Substance Use Topics  Alcohol use: Yes Comment: ocassionally  Drug use: No  
 
He  reports that he has been smoking cigarettes. He started smoking about 32 years ago. He has a 28.00 pack-year smoking history. He uses smokeless tobacco.  He  reports that he drinks alcohol. Review of Systems: 
Cardiac symptoms as noted above in HPI. All others negative. Denies fatigue, malaise, skin rash, joint pain, blurring vision, photophobia, neck pain, hemoptysis, chronic cough, nausea, vomiting, hematuria, burning micturition, BRBPR, chronic headaches. Physical Exam: 
BP Readings from Last 3 Encounters:  
04/04/19 (!) 144/99  
03/18/19 130/90  
03/07/19 117/81 Pulse Readings from Last 3 Encounters:  
04/04/19 76  
03/18/19 76  
03/07/19 89 Wt Readings from Last 3 Encounters:  
04/04/19 207 lb (93.9 kg) 03/18/19 202 lb (91.6 kg) 03/07/19 202 lb (91.6 kg) Constitutional: Oriented to person, place, and time. HENT: Head: Normocephalic and atraumatic. Eyes: Conjunctivae and extraocular motions are normal.  
Neck: No JVD present. Carotid bruit is not appreciated. Cardiovascular: Regular rhythm. No murmur, gallop or rubs appreciated Lung: Breath sounds normal. No respiratory distress. No ronchi or rales appreciated Abdominal: No tenderness. No rebound and no guarding. Musculoskeletal: There is no lower extremity edema. No cynosis Lymphadenopathy:  No cervical or supraclavicular adenopathy appriciated. Neurological: No gross motor deficit noted. Skin: No visible skin rash noted. No Ear discharge noted Psychiatric: Normal mood and affect. Good distal pulse Review of Data LABS:  
Lab Results Component Value Date/Time Sodium 147 (H) 02/25/2019 12:00 AM  
 Potassium 3.9 02/25/2019 12:00 AM  
 Chloride 107 (H) 02/25/2019 12:00 AM  
 CO2 20 02/25/2019 12:00 AM  
 Glucose 102 (H) 02/25/2019 12:00 AM  
 BUN 9 02/25/2019 12:00 AM  
 Creatinine 1.06 02/25/2019 12:00 AM  
 
Lipids Latest Ref Rng & Units 2/25/2019 11/15/2017 Chol, Total 100 - 199 mg/dL 226(H) 247(H) HDL >39 mg/dL 40 34(L)  
LDL 0 - 99 mg/dL 160(H) 168(H) Trig 0 - 149 mg/dL 128 224(H) Lab Results Component Value Date/Time ALT (SGPT) 32 02/25/2019 12:00 AM  
 
Lab Results Component Value Date/Time Hemoglobin A1c (POC) 5.8 06/15/2017 10:58 AM  
 
 
EKG 
 
ECHO (12/18) 
ft Ventricle Normal cavity size and systolic function (ejection fraction normal). Mild concentric hypertrophy observed. The estimated ejection fraction is 56 - 60%. Unable to assess diastolic function. Left Atrium The cavity size is mildly dilated. Right Ventricle Normal cavity size and global systolic function. Right Atrium The cavity size is mildly dilated. Aortic Valve Normal aortic valve structure. No stenosis and no regurgitation. Mitral Valve Normal valve structure and no stenosis. Trace regurgitation. Tricuspid Valve Normal valve structure and no stenosis. Mild tricuspid valve regurgitation. Pulmonary arterial systolic pressure is 43 mmHg. Mild pulmonary hypertension. STRESS TEST (EST, PHARM, NUC, ECHO etc) CATHETERIZATION IMPRESSION & PLAN: 
 Mr. Sangeetha Calderon is 78-year-old male with a history of hypertension, GERD Hypertension: 
Blood pressure today is 144/98 mmHg. Currently he is on losartan, hydrochlorthiazide, amlodipine and metoprolol. I am going to stop metoprolol and start him on nonselective Coreg 12.5 mg twice daily. I will increase this dose as much as possible. I will also order a renal artery Doppler to make sure he does not have any renal artery stenosis. He did have a echocardiogram done in December 2018 with normal ejection fraction. Salt restriction and importance of compliance with the medication was discussed with the patient today. Patient likely had diastolic congestive heart failure in setting of noncompliance with her antihypertensive medication in December 2018. Since then he has been taking medications regularly. I agreed to continue with beta-blocker, amlodipine, hydrochlorthiazide and losartan at this time. He does not have any anginal symptoms or no evidence of fluid overload on exam today. Importance of diet and exercise was discussed with patient. This plan was discussed with patient who is in agreement. Thank you for allowing me to participate in patient care. Please feel free to call me if you have any question or concern. Sara Greer MD 
Please note: This document has been produced using voice recognition software. Unrecognized errors in transcription may be present.

## 2019-04-04 NOTE — PROGRESS NOTES
1. Have you been to the ER, urgent care clinic since your last visit? Hospitalized since your last visit? No 
 
2. Have you seen or consulted any other health care providers outside of the [address] [place] [address] since your last visit? Include any pap smears or colon screening.   No

## 2019-04-10 ENCOUNTER — OFFICE VISIT (OUTPATIENT)
Dept: FAMILY MEDICINE CLINIC | Age: 44
End: 2019-04-10

## 2019-04-10 VITALS
SYSTOLIC BLOOD PRESSURE: 168 MMHG | BODY MASS INDEX: 34.49 KG/M2 | OXYGEN SATURATION: 97 % | HEIGHT: 65 IN | DIASTOLIC BLOOD PRESSURE: 110 MMHG | TEMPERATURE: 96.9 F | HEART RATE: 78 BPM | WEIGHT: 207 LBS | RESPIRATION RATE: 18 BRPM

## 2019-04-10 DIAGNOSIS — F17.210 CIGARETTE SMOKER: ICD-10-CM

## 2019-04-10 DIAGNOSIS — I10 ESSENTIAL HYPERTENSION: Primary | ICD-10-CM

## 2019-04-10 DIAGNOSIS — E78.00 HYPERCHOLESTEREMIA: ICD-10-CM

## 2019-04-10 RX ORDER — HYDROCHLOROTHIAZIDE 25 MG/1
25 TABLET ORAL DAILY
Qty: 90 TAB | Refills: 1 | Status: SHIPPED | OUTPATIENT
Start: 2019-04-10

## 2019-04-10 RX ORDER — AMLODIPINE BESYLATE 10 MG/1
10 TABLET ORAL DAILY
Qty: 90 TAB | Refills: 1 | Status: SHIPPED | OUTPATIENT
Start: 2019-04-10

## 2019-04-10 RX ORDER — CARVEDILOL 12.5 MG/1
12.5 TABLET ORAL 2 TIMES DAILY WITH MEALS
Qty: 180 TAB | Refills: 1 | Status: SHIPPED | OUTPATIENT
Start: 2019-04-10

## 2019-04-10 RX ORDER — ATORVASTATIN CALCIUM 20 MG/1
20 TABLET, FILM COATED ORAL DAILY
Qty: 90 TAB | Refills: 1 | Status: SHIPPED | OUTPATIENT
Start: 2019-04-10

## 2019-04-10 RX ORDER — LOSARTAN POTASSIUM 50 MG/1
50 TABLET ORAL 2 TIMES DAILY
Qty: 180 TAB | Refills: 1 | Status: SHIPPED | OUTPATIENT
Start: 2019-04-10

## 2019-04-10 NOTE — PROGRESS NOTES
Juan Francisco Borges is a 44 y.o.  male and presents with    Chief Complaint   Patient presents with    Hypertension       Subjective:  Mr. Bill Martinez presents today for follow up of his blood pressure. He saw Dr. Burnetta Goltz on 4/4 and metoprolol was discontinued and carvedilol was added. He has not picked up this new prescription yet and he believes he ran out of one of his medications but is unsure of which one. He continues to smoke and is smoking 1/2 PPD. He admits to smoking a cigarette prior to his office visit. Cardiovascular Review:  The patient has hypertension and Diastolic HF. Diet and Lifestyle: not attempting to follow a low sodium diet, sedentary, smoker 1/2 PPD  Home BP Monitoring: is not measured at home. Pertinent ROS: taking medications as instructed, no medication side effects noted, no TIA's, no chest pain on exertion, no dyspnea on exertion, no swelling of ankles. Additional Concerns: No         Patient Active Problem List   Diagnosis Code    Inguinal hernia of left side without obstruction or gangrene K40.90    History of kidney stones Z87.442    Essential hypertension I10    Rectal bleeding K62.5    Internal and external prolapsed hemorrhoids K64.8    Acute pulmonary edema (HCC) J81.0    Hypertensive emergency I16.1     Current Outpatient Medications   Medication Sig Dispense Refill    losartan (COZAAR) 50 mg tablet Take 1 Tab by mouth two (2) times a day. 60 Tab 2    hydroCHLOROthiazide (HYDRODIURIL) 25 mg tablet Take 1 Tab by mouth daily. 30 Tab 2    amLODIPine (NORVASC) 10 mg tablet Take 1 Tab by mouth daily. 30 Tab 2    atorvastatin (LIPITOR) 20 mg tablet Take 1 Tab by mouth daily. 30 Tab 3    albuterol (PROVENTIL HFA, VENTOLIN HFA, PROAIR HFA) 90 mcg/actuation inhaler Take 1 Puff by inhalation every four (4) hours as needed for Wheezing or Shortness of Breath. 1 Inhaler 3    carvedilol (COREG) 12.5 mg tablet Take 1 Tab by mouth two (2) times daily (with meals). 60 Tab 6     Allergies   Allergen Reactions    Lisinopril Other (comments)     Sore throat     Past Medical History:   Diagnosis Date    Chronic pain     back    GERD (gastroesophageal reflux disease)     Hypertension     Kidney stone     Tobacco abuse      Past Surgical History:   Procedure Laterality Date    COLONOSCOPY N/A 9/15/2017    [facility]OSCOPY    HX GI      hemorrhoidectomy    HX HEMORRHOIDECTOMY  11/02/2017    HX ORTHOPAEDIC      left ankle, hardware in place     History reviewed. No pertinent family history. Social History     Tobacco Use    Smoking status: Current Every Day Smoker     Packs/day: 1.00     Years: 28.00     Pack years: 28.00     Types: Cigarettes     Start date: 3/18/1987    Smokeless tobacco: Current User   Substance Use Topics    Alcohol use: Yes     Comment: ocassionally       ROS   History obtained from the patient  General ROS: negative for - chills or fever  Respiratory ROS: no cough, shortness of breath, or wheezing  Cardiovascular ROS: no chest pain or dyspnea on exertion    All other systems reviewed and are negative. Objective:  Vitals:    04/10/19 0814 04/10/19 0817 04/10/19 0843   BP: (!) 170/115 (!) 170/118 (!) 168/110   Pulse: 78     Resp: 18     Temp: 96.9 °F (36.1 °C)     TempSrc: Oral     SpO2: 97%     Weight: 207 lb (93.9 kg)     Height: 5' 4.5\" (1.638 m)     PainSc:   0 - No pain         PE  General appearance - alert, well appearing, and in no distress  Mental status - normal mood, behavior, speech, dress, motor activity, and thought processes  Chest - clear to auscultation, no wheezes, rales or rhonchi, symmetric air entry  Heart - normal rate and regular rhythm  Extremities - peripheral pulses normal, no pedal edema, no clubbing or cyanosis      Assessment/Plan:    1.  Hypertension- uncontrolled; has not taken coreg and ran out of one medication (unsure of which one); will prescribe 90 day supply of meds to promote adherence; low sodium diet; if experiencing CP, SOB, or RODRIGUEZ go to ED    2. Tobacco abuse- The patient was counseled on the dangers of tobacco use, and was advised to quit. Reviewed strategies to maximize success, including written materials. Total time spent in discussion: 3 minutes      Lab review: no lab studies available for review at time of visit      Today's Visit:   Orders Placed This Encounter    losartan (COZAAR) 50 mg tablet     Sig: Take 1 Tab by mouth two (2) times a day. Dispense:  180 Tab     Refill:  1     Please consider 90 day supplies to promote better adherence    hydroCHLOROthiazide (HYDRODIURIL) 25 mg tablet     Sig: Take 1 Tab by mouth daily. Dispense:  90 Tab     Refill:  1    amLODIPine (NORVASC) 10 mg tablet     Sig: Take 1 Tab by mouth daily. Dispense:  90 Tab     Refill:  1    atorvastatin (LIPITOR) 20 mg tablet     Sig: Take 1 Tab by mouth daily. Dispense:  90 Tab     Refill:  1    carvedilol (COREG) 12.5 mg tablet     Sig: Take 1 Tab by mouth two (2) times daily (with meals). Dispense:  180 Tab     Refill:  1         Health Maintenance:   [facility]  Pneumococcal Vaccine- declined    I have discussed the diagnosis with the patient and the intended plan as seen in the above orders. The patient has received an after-visit summary and questions were answered concerning future plans. I have discussed medication side effects and warnings with the patient as well. I have reviewed the plan of care with the patient, accepted their input and they are in agreement with the treatment goals. Follow-up and Dispositions    · Return in about 3 weeks (around 5/1/2019) for BP check. More than 1/2 of this 15 minute visit was spent in counseling and coordination of care, as described above.     Zach Abreu, ALICIAC

## 2019-04-10 NOTE — PATIENT INSTRUCTIONS
DASH Diet: Care Instructions  Your Care Instructions    The DASH diet is an eating plan that can help lower your blood pressure. DASH stands for Dietary Approaches to Stop Hypertension. Hypertension is high blood pressure. The DASH diet focuses on eating foods that are high in calcium, potassium, and magnesium. These nutrients can lower blood pressure. The foods that are highest in these nutrients are fruits, vegetables, low-fat dairy products, nuts, seeds, and legumes. But taking calcium, potassium, and magnesium supplements instead of eating foods that are high in those nutrients does not have the same effect. The DASH diet also includes whole grains, fish, and poultry. The DASH diet is one of several lifestyle changes your doctor may recommend to lower your high blood pressure. Your doctor may also want you to decrease the amount of sodium in your diet. Lowering sodium while following the DASH diet can lower blood pressure even further than just the DASH diet alone. Follow-up care is a key part of your treatment and safety. Be sure to make and go to all appointments, and call your doctor if you are having problems. It's also a good idea to know your test results and keep a list of the medicines you take. How can you care for yourself at home? Following the DASH diet  · Eat 4 to 5 servings of fruit each day. A serving is 1 medium-sized piece of fruit, ½ cup chopped or canned fruit, 1/4 cup dried fruit, or 4 ounces (½ cup) of fruit juice. Choose fruit more often than fruit juice. · Eat 4 to 5 servings of vegetables each day. A serving is 1 cup of lettuce or raw leafy vegetables, ½ cup of chopped or cooked vegetables, or 4 ounces (½ cup) of vegetable juice. Choose vegetables more often than vegetable juice. · Get 2 to 3 servings of low-fat and fat-free dairy each day. A serving is 8 ounces of milk, 1 cup of yogurt, or 1 ½ ounces of cheese. · Eat 6 to 8 servings of grains each day.  A serving is 1 slice of bread, 1 ounce of dry cereal, or ½ cup of cooked rice, pasta, or cooked cereal. Try to choose whole-grain products as much as possible. · Limit lean meat, poultry, and fish to 2 servings each day. A serving is 3 ounces, about the size of a deck of cards. · Eat 4 to 5 servings of nuts, seeds, and legumes (cooked dried beans, lentils, and split peas) each week. A serving is 1/3 cup of nuts, 2 tablespoons of seeds, or ½ cup of cooked beans or peas. · Limit fats and oils to 2 to 3 servings each day. A serving is 1 teaspoon of vegetable oil or 2 tablespoons of salad dressing. · Limit sweets and added sugars to 5 servings or less a week. A serving is 1 tablespoon jelly or jam, ½ cup sorbet, or 1 cup of lemonade. · Eat less than 2,300 milligrams (mg) of sodium a day. If you limit your sodium to 1,500 mg a day, you can lower your blood pressure even more. Tips for success  · Start small. Do not try to make dramatic changes to your diet all at once. You might feel that you are missing out on your favorite foods and then be more likely to not follow the plan. Make small changes, and stick with them. Once those changes become habit, add a few more changes. · Try some of the following:  ? Make it a goal to eat a fruit or vegetable at every meal and at snacks. This will make it easy to get the recommended amount of fruits and vegetables each day. ? Try yogurt topped with fruit and nuts for a snack or healthy dessert. ? Add lettuce, tomato, cucumber, and onion to sandwiches. ? Combine a ready-made pizza crust with low-fat mozzarella cheese and lots of vegetable toppings. Try using tomatoes, squash, spinach, broccoli, carrots, cauliflower, and onions. ? Have a variety of cut-up vegetables with a low-fat dip as an appetizer instead of chips and dip. ? Sprinkle sunflower seeds or chopped almonds over salads. Or try adding chopped walnuts or almonds to cooked vegetables.   ? Try some vegetarian meals using beans and peas. Add garbanzo or kidney beans to salads. Make burritos and tacos with mashed joyce beans or black beans. Where can you learn more? Go to http://nasra-ashleigh.info/. Enter N745 in the search box to learn more about \"DASH Diet: Care Instructions. \"  Current as of: July 22, 2018  Content Version: 11.9  © 8176-6655 [employer]. Care instructions adapted under license by [employer] (which disclaims liability or warranty for this information). If you have questions about a medical condition or this instruction, always ask your healthcare professional. [address] any warranty or liability for your use of this information.

## 2019-04-10 NOTE — PROGRESS NOTES
Roxane Crow is a 44 y.o. male  Chief Complaint   Patient presents with    Hypertension     1. Have you been to the ER, urgent care clinic since your last visit? Hospitalized since your last visit? No    2. Have you seen or consulted any other health care providers outside of the [address] [place] [address] since your last visit? Include any pap smears or colon screening.  No

## 2023-05-30 NOTE — PATIENT INSTRUCTIONS
If you have any questions or concerns about today's appointment, the verbal and/or written instructions you were given for follow up care, please call our office at [phone].     [facility] - [address]    [phone] office  [phone] Placenta

## 2023-10-15 NOTE — INTERVAL H&P NOTE
H&P Update:  Anna Borges was seen and examined. History and physical has been reviewed. The patient has been examined.  There have been no significant clinical changes since the completion of the originally dated History and Physical.    Signed By: Roseanna Acuna MD     November 2, 2017 11:05 AM Shortness of breath

## 2025-05-29 NOTE — PROGRESS NOTES
1. Have you been to the ER, urgent care clinic since your last visit? Hospitalized since your last visit? No    2. Have you seen or consulted any other health care providers outside of the [address] [place] [address] since your last visit? Include any pap smears or colon screening.  No no Initial

## (undated) DEVICE — SYR 10ML CTRL LR LCK NSAF LF --

## (undated) DEVICE — HEX-LOCKING BLADE ELECTRODE: Brand: EDGE

## (undated) DEVICE — KIT COLON W/ 1.1OZ LUB AND 2 END

## (undated) DEVICE — SYR 50ML SLIP TIP NSAF LF STRL --

## (undated) DEVICE — SLEEVE COMPR STD 12 IN FOR 165IN CALF COMFORT VENODYNE SYS

## (undated) DEVICE — STERILE LATEX POWDER-FREE SURGICAL GLOVESWITH NITRILE COATING: Brand: PROTEXIS

## (undated) DEVICE — HARMONIC HAND PIECE BLUE --

## (undated) DEVICE — KENDALL 500 SERIES DIAPHORETIC FOAM MONITORING ELECTRODE - TEAR DROP SHAPE ( 30/PK): Brand: KENDALL

## (undated) DEVICE — SYR IRR CATH TIP LR ADPT 70ML -- CONVERT TO ITEM 363120

## (undated) DEVICE — PAD,ABDOMINAL,5"X9",STERILE,LF,1/PK: Brand: MEDLINE INDUSTRIES, INC.

## (undated) DEVICE — NEEDLE HYPO 25GA L1.5IN BLU POLYPR HUB S STL REG BVL STR

## (undated) DEVICE — BASIN EMESIS 500CC ROSE 250/CS 60/PLT: Brand: MEDEGEN MEDICAL PRODUCTS, LLC

## (undated) DEVICE — TRAY PREP DRY W/ PREM GLV 2 APPL 6 SPNG 2 UNDPD 1 OVERWRAP

## (undated) DEVICE — STERILE POLYISOPRENE POWDER-FREE SURGICAL GLOVES: Brand: PROTEXIS

## (undated) DEVICE — SOLUTION IV 1000ML 0.9% SOD CHL

## (undated) DEVICE — DEVICE INFL 60ML 12ATM CONVENIENT LOK REL HNDL HI PRSS FLX

## (undated) DEVICE — FLEX ADVANTAGE 1500CC: Brand: FLEX ADVANTAGE

## (undated) DEVICE — SKIN MARKER,REGULAR TIP WITH RULER AND LABELS: Brand: DEVON

## (undated) DEVICE — SHEAR HARMONIC FOCUS OEM 9CM --

## (undated) DEVICE — KENDALL SCD EXPRESS SLEEVES, KNEE LENGTH, MEDIUM: Brand: KENDALL SCD

## (undated) DEVICE — REM POLYHESIVE ADULT PATIENT RETURN ELECTRODE: Brand: VALLEYLAB

## (undated) DEVICE — SPONGE GZ W4XL4IN COT 12 PLY TYP VII WVN C FLD DSGN

## (undated) DEVICE — Device

## (undated) DEVICE — MEDI-VAC NON-CONDUCTIVE SUCTION TUBING: Brand: CARDINAL HEALTH

## (undated) DEVICE — SUT CHRMC 3-0 27IN SH BRN --

## (undated) DEVICE — NEEDLE HYPO 25GA L1.5IN BVL ORIENTED ECLIPSE

## (undated) DEVICE — FLEXIBLE YANKAUER,MEDIUM TIP, NO VACUUM CONTROL: Brand: ARGYLE